# Patient Record
Sex: FEMALE | Race: WHITE | NOT HISPANIC OR LATINO | URBAN - METROPOLITAN AREA
[De-identification: names, ages, dates, MRNs, and addresses within clinical notes are randomized per-mention and may not be internally consistent; named-entity substitution may affect disease eponyms.]

---

## 2016-08-17 LAB
EXTERNAL ABO GROUPING: NORMAL
EXTERNAL ANTIBODY SCREEN: NEGATIVE
EXTERNAL HEPATITIS B SURFACE ANTIGEN: NEGATIVE
EXTERNAL RH FACTOR: POSITIVE
EXTERNAL RUBELLA QUALITATIVE: NORMAL
EXTERNAL SYPHILIS RPR SCREEN: NORMAL
HIV1 AB SPEC QL IA.RAPID: NEGATIVE

## 2016-11-09 LAB — EXTERNAL URINE DRUG SCREEN: NORMAL

## 2017-01-05 LAB — EXTERNAL GTT 1 HOUR: 108

## 2017-03-01 ENCOUNTER — LAB REQUISITION (OUTPATIENT)
Dept: LAB | Facility: HOSPITAL | Age: 32
End: 2017-03-01

## 2017-03-01 DIAGNOSIS — Z36.9 ENCOUNTER FOR ANTENATAL SCREENING OF MOTHER: ICD-10-CM

## 2017-03-01 LAB — EXTERNAL GROUP B STREP ANTIGEN: NEGATIVE

## 2017-03-01 PROCEDURE — 87081 CULTURE SCREEN ONLY: CPT | Performed by: OBSTETRICS & GYNECOLOGY

## 2017-03-04 LAB — BACTERIA SPEC AEROBE CULT: NORMAL

## 2017-03-23 ENCOUNTER — HOSPITAL ENCOUNTER (INPATIENT)
Dept: LABOR AND DELIVERY | Facility: HOSPITAL | Age: 32
LOS: 2 days | Discharge: HOME | End: 2017-03-25
Attending: OBSTETRICS & GYNECOLOGY | Admitting: OBSTETRICS & GYNECOLOGY

## 2017-03-23 ENCOUNTER — ANESTHESIA (OUTPATIENT)
Dept: LABOR AND DELIVERY | Facility: HOSPITAL | Age: 32
End: 2017-03-23

## 2017-03-23 ENCOUNTER — ANESTHESIA EVENT (OUTPATIENT)
Dept: LABOR AND DELIVERY | Facility: HOSPITAL | Age: 32
End: 2017-03-23

## 2017-03-23 PROBLEM — Z34.90 PREGNANT: Status: ACTIVE | Noted: 2017-03-23

## 2017-03-23 LAB
ABO GROUP BLD: NORMAL
BLD GP AB SCN SERPL QL: NEGATIVE
DEPRECATED RDW RBC AUTO: 52.3 FL (ref 37–54)
ERYTHROCYTE [DISTWIDTH] IN BLOOD BY AUTOMATED COUNT: 14.7 % (ref 11.3–14.5)
HCT VFR BLD AUTO: 33.7 % (ref 34.5–44)
HGB BLD-MCNC: 10.7 G/DL (ref 11.5–15.5)
MCH RBC QN AUTO: 30.7 PG (ref 27–31)
MCHC RBC AUTO-ENTMCNC: 31.8 G/DL (ref 32–36)
MCV RBC AUTO: 96.8 FL (ref 80–99)
PLATELET # BLD AUTO: 161 10*3/MM3 (ref 150–450)
PMV BLD AUTO: 10.8 FL (ref 6–12)
RBC # BLD AUTO: 3.48 10*6/MM3 (ref 3.89–5.14)
RH BLD: POSITIVE
WBC NRBC COR # BLD: 10.11 10*3/MM3 (ref 3.5–10.8)

## 2017-03-23 PROCEDURE — 86900 BLOOD TYPING SEROLOGIC ABO: CPT | Performed by: OBSTETRICS & GYNECOLOGY

## 2017-03-23 PROCEDURE — 59025 FETAL NON-STRESS TEST: CPT

## 2017-03-23 PROCEDURE — 25010000002 ONDANSETRON PER 1 MG: Performed by: OBSTETRICS & GYNECOLOGY

## 2017-03-23 PROCEDURE — 85027 COMPLETE CBC AUTOMATED: CPT | Performed by: OBSTETRICS & GYNECOLOGY

## 2017-03-23 PROCEDURE — 86850 RBC ANTIBODY SCREEN: CPT | Performed by: OBSTETRICS & GYNECOLOGY

## 2017-03-23 PROCEDURE — 10907ZC DRAINAGE OF AMNIOTIC FLUID, THERAPEUTIC FROM PRODUCTS OF CONCEPTION, VIA NATURAL OR ARTIFICIAL OPENING: ICD-10-PCS | Performed by: OBSTETRICS & GYNECOLOGY

## 2017-03-23 PROCEDURE — 25010000002 FENTANYL CITRATE (PF) 100 MCG/2ML SOLUTION: Performed by: NURSE ANESTHETIST, CERTIFIED REGISTERED

## 2017-03-23 PROCEDURE — C1755 CATHETER, INTRASPINAL: HCPCS

## 2017-03-23 PROCEDURE — 86901 BLOOD TYPING SEROLOGIC RH(D): CPT | Performed by: OBSTETRICS & GYNECOLOGY

## 2017-03-23 PROCEDURE — C1755 CATHETER, INTRASPINAL: HCPCS | Performed by: ANESTHESIOLOGY

## 2017-03-23 PROCEDURE — 25010000002 ROPIVACAINE PER 1 MG: Performed by: NURSE ANESTHETIST, CERTIFIED REGISTERED

## 2017-03-23 RX ORDER — PROMETHAZINE HYDROCHLORIDE 12.5 MG/1
12.5 TABLET ORAL EVERY 6 HOURS PRN
Status: DISCONTINUED | OUTPATIENT
Start: 2017-03-23 | End: 2017-03-23 | Stop reason: HOSPADM

## 2017-03-23 RX ORDER — OXYTOCIN/RINGER'S LACTATE 20/1000 ML
999 PLASTIC BAG, INJECTION (ML) INTRAVENOUS ONCE
Status: COMPLETED | OUTPATIENT
Start: 2017-03-23 | End: 2017-03-23

## 2017-03-23 RX ORDER — PROMETHAZINE HYDROCHLORIDE 12.5 MG/1
12.5 SUPPOSITORY RECTAL EVERY 6 HOURS PRN
Status: DISCONTINUED | OUTPATIENT
Start: 2017-03-23 | End: 2017-03-23 | Stop reason: HOSPADM

## 2017-03-23 RX ORDER — PROMETHAZINE HYDROCHLORIDE 25 MG/ML
12.5 INJECTION, SOLUTION INTRAMUSCULAR; INTRAVENOUS EVERY 6 HOURS PRN
Status: DISCONTINUED | OUTPATIENT
Start: 2017-03-23 | End: 2017-03-23

## 2017-03-23 RX ORDER — LIDOCAINE HYDROCHLORIDE 20 MG/ML
INJECTION, SOLUTION INFILTRATION; PERINEURAL AS NEEDED
Status: DISCONTINUED | OUTPATIENT
Start: 2017-03-23 | End: 2017-03-23 | Stop reason: SURG

## 2017-03-23 RX ORDER — IBUPROFEN 600 MG/1
600 TABLET ORAL EVERY 6 HOURS PRN
Status: DISCONTINUED | OUTPATIENT
Start: 2017-03-23 | End: 2017-03-25 | Stop reason: HOSPADM

## 2017-03-23 RX ORDER — CARBOPROST TROMETHAMINE 250 UG/ML
250 INJECTION, SOLUTION INTRAMUSCULAR AS NEEDED
Status: DISCONTINUED | OUTPATIENT
Start: 2017-03-23 | End: 2017-03-23 | Stop reason: HOSPADM

## 2017-03-23 RX ORDER — ONDANSETRON 2 MG/ML
4 INJECTION INTRAMUSCULAR; INTRAVENOUS ONCE AS NEEDED
Status: DISCONTINUED | OUTPATIENT
Start: 2017-03-23 | End: 2017-03-23

## 2017-03-23 RX ORDER — ACETAMINOPHEN 325 MG/1
650 TABLET ORAL EVERY 4 HOURS PRN
Status: DISCONTINUED | OUTPATIENT
Start: 2017-03-23 | End: 2017-03-23

## 2017-03-23 RX ORDER — PROMETHAZINE HYDROCHLORIDE 25 MG/1
25 TABLET ORAL EVERY 6 HOURS PRN
COMMUNITY
End: 2017-03-25 | Stop reason: HOSPADM

## 2017-03-23 RX ORDER — DIPHENHYDRAMINE HYDROCHLORIDE 50 MG/ML
12.5 INJECTION INTRAMUSCULAR; INTRAVENOUS EVERY 8 HOURS PRN
Status: DISCONTINUED | OUTPATIENT
Start: 2017-03-23 | End: 2017-03-23

## 2017-03-23 RX ORDER — METOCLOPRAMIDE HYDROCHLORIDE 5 MG/ML
10 INJECTION INTRAMUSCULAR; INTRAVENOUS ONCE AS NEEDED
Status: DISCONTINUED | OUTPATIENT
Start: 2017-03-23 | End: 2017-03-23

## 2017-03-23 RX ORDER — FAMOTIDINE 10 MG/ML
20 INJECTION, SOLUTION INTRAVENOUS ONCE AS NEEDED
Status: DISCONTINUED | OUTPATIENT
Start: 2017-03-23 | End: 2017-03-23

## 2017-03-23 RX ORDER — SODIUM CHLORIDE 0.9 % (FLUSH) 0.9 %
1-10 SYRINGE (ML) INJECTION AS NEEDED
Status: DISCONTINUED | OUTPATIENT
Start: 2017-03-23 | End: 2017-03-23

## 2017-03-23 RX ORDER — OXYTOCIN/RINGER'S LACTATE 30/500 ML
2-24 PLASTIC BAG, INJECTION (ML) INTRAVENOUS
Status: DISCONTINUED | OUTPATIENT
Start: 2017-03-23 | End: 2017-03-23

## 2017-03-23 RX ORDER — POLYETHYLENE GLYCOL 3350 17 G/17G
17 POWDER, FOR SOLUTION ORAL DAILY
Status: DISCONTINUED | OUTPATIENT
Start: 2017-03-23 | End: 2017-03-25 | Stop reason: HOSPADM

## 2017-03-23 RX ORDER — SODIUM CHLORIDE, SODIUM LACTATE, POTASSIUM CHLORIDE, CALCIUM CHLORIDE 600; 310; 30; 20 MG/100ML; MG/100ML; MG/100ML; MG/100ML
125 INJECTION, SOLUTION INTRAVENOUS CONTINUOUS
Status: DISCONTINUED | OUTPATIENT
Start: 2017-03-23 | End: 2017-03-23

## 2017-03-23 RX ORDER — PROMETHAZINE HYDROCHLORIDE 12.5 MG/1
12.5 SUPPOSITORY RECTAL EVERY 6 HOURS PRN
Status: DISCONTINUED | OUTPATIENT
Start: 2017-03-23 | End: 2017-03-23

## 2017-03-23 RX ORDER — BISACODYL 10 MG
10 SUPPOSITORY, RECTAL RECTAL DAILY PRN
Status: DISCONTINUED | OUTPATIENT
Start: 2017-03-24 | End: 2017-03-25 | Stop reason: HOSPADM

## 2017-03-23 RX ORDER — PROMETHAZINE HYDROCHLORIDE 25 MG/ML
12.5 INJECTION, SOLUTION INTRAMUSCULAR; INTRAVENOUS EVERY 6 HOURS PRN
Status: DISCONTINUED | OUTPATIENT
Start: 2017-03-23 | End: 2017-03-23 | Stop reason: HOSPADM

## 2017-03-23 RX ORDER — MORPHINE SULFATE 4 MG/ML
2 INJECTION, SOLUTION INTRAMUSCULAR; INTRAVENOUS
Status: DISCONTINUED | OUTPATIENT
Start: 2017-03-23 | End: 2017-03-23 | Stop reason: HOSPADM

## 2017-03-23 RX ORDER — PRENATAL WITH FERROUS FUM AND FOLIC ACID 3080; 920; 120; 400; 22; 1.84; 3; 20; 10; 1; 12; 200; 27; 25; 2 [IU]/1; [IU]/1; MG/1; [IU]/1; MG/1; MG/1; MG/1; MG/1; MG/1; MG/1; UG/1; MG/1; MG/1; MG/1; MG/1
TABLET ORAL DAILY
COMMUNITY

## 2017-03-23 RX ORDER — OXYTOCIN/RINGER'S LACTATE 20/1000 ML
125 PLASTIC BAG, INJECTION (ML) INTRAVENOUS AS NEEDED
Status: DISCONTINUED | OUTPATIENT
Start: 2017-03-23 | End: 2017-03-23 | Stop reason: HOSPADM

## 2017-03-23 RX ORDER — LIDOCAINE HYDROCHLORIDE AND EPINEPHRINE 20; 5 MG/ML; UG/ML
INJECTION, SOLUTION EPIDURAL; INFILTRATION; INTRACAUDAL; PERINEURAL AS NEEDED
Status: DISCONTINUED | OUTPATIENT
Start: 2017-03-23 | End: 2017-03-23 | Stop reason: SURG

## 2017-03-23 RX ORDER — IBUPROFEN 600 MG/1
600 TABLET ORAL EVERY 6 HOURS PRN
Status: DISCONTINUED | OUTPATIENT
Start: 2017-03-23 | End: 2017-03-23 | Stop reason: HOSPADM

## 2017-03-23 RX ORDER — ZOLPIDEM TARTRATE 5 MG/1
5 TABLET ORAL NIGHTLY PRN
Status: DISCONTINUED | OUTPATIENT
Start: 2017-03-23 | End: 2017-03-25 | Stop reason: HOSPADM

## 2017-03-23 RX ORDER — HYDROCODONE BITARTRATE AND ACETAMINOPHEN 5; 325 MG/1; MG/1
1 TABLET ORAL EVERY 4 HOURS PRN
Status: DISCONTINUED | OUTPATIENT
Start: 2017-03-23 | End: 2017-03-25 | Stop reason: HOSPADM

## 2017-03-23 RX ORDER — PROMETHAZINE HYDROCHLORIDE 12.5 MG/1
12.5 TABLET ORAL EVERY 6 HOURS PRN
Status: DISCONTINUED | OUTPATIENT
Start: 2017-03-23 | End: 2017-03-23

## 2017-03-23 RX ORDER — DOCUSATE SODIUM 100 MG/1
100 CAPSULE, LIQUID FILLED ORAL 2 TIMES DAILY
Status: DISCONTINUED | OUTPATIENT
Start: 2017-03-23 | End: 2017-03-25 | Stop reason: HOSPADM

## 2017-03-23 RX ORDER — TERBUTALINE SULFATE 1 MG/ML
0.25 INJECTION, SOLUTION SUBCUTANEOUS AS NEEDED
Status: DISCONTINUED | OUTPATIENT
Start: 2017-03-23 | End: 2017-03-23

## 2017-03-23 RX ORDER — PRENATAL WITH FERROUS FUM AND FOLIC ACID 3080; 920; 120; 400; 22; 1.84; 3; 20; 10; 1; 12; 200; 27; 25; 2 [IU]/1; [IU]/1; MG/1; [IU]/1; MG/1; MG/1; MG/1; MG/1; MG/1; MG/1; UG/1; MG/1; MG/1; MG/1; MG/1
1 TABLET ORAL DAILY
Status: DISCONTINUED | OUTPATIENT
Start: 2017-03-23 | End: 2017-03-25 | Stop reason: HOSPADM

## 2017-03-23 RX ORDER — ONDANSETRON 2 MG/ML
4 INJECTION INTRAMUSCULAR; INTRAVENOUS EVERY 6 HOURS PRN
Status: DISCONTINUED | OUTPATIENT
Start: 2017-03-23 | End: 2017-03-25 | Stop reason: HOSPADM

## 2017-03-23 RX ORDER — LANOLIN 100 %
OINTMENT (GRAM) TOPICAL
Status: DISCONTINUED | OUTPATIENT
Start: 2017-03-23 | End: 2017-03-25 | Stop reason: HOSPADM

## 2017-03-23 RX ORDER — LIDOCAINE HYDROCHLORIDE AND EPINEPHRINE 15; 5 MG/ML; UG/ML
INJECTION, SOLUTION EPIDURAL AS NEEDED
Status: DISCONTINUED | OUTPATIENT
Start: 2017-03-23 | End: 2017-03-23 | Stop reason: SURG

## 2017-03-23 RX ORDER — PROMETHAZINE HYDROCHLORIDE 25 MG/1
25 TABLET ORAL EVERY 6 HOURS PRN
Status: DISCONTINUED | OUTPATIENT
Start: 2017-03-23 | End: 2017-03-25 | Stop reason: HOSPADM

## 2017-03-23 RX ORDER — EPHEDRINE SULFATE/0.9% NACL/PF 50 MG/10ML
10 SYRINGE (ML) INTRAVENOUS
Status: DISCONTINUED | OUTPATIENT
Start: 2017-03-23 | End: 2017-03-23

## 2017-03-23 RX ORDER — METHYLERGONOVINE MALEATE 0.2 MG/ML
200 INJECTION INTRAVENOUS AS NEEDED
Status: DISCONTINUED | OUTPATIENT
Start: 2017-03-23 | End: 2017-03-23 | Stop reason: HOSPADM

## 2017-03-23 RX ORDER — FENTANYL CITRATE 50 UG/ML
INJECTION, SOLUTION INTRAMUSCULAR; INTRAVENOUS AS NEEDED
Status: DISCONTINUED | OUTPATIENT
Start: 2017-03-23 | End: 2017-03-23 | Stop reason: SURG

## 2017-03-23 RX ORDER — LIDOCAINE HYDROCHLORIDE 10 MG/ML
5 INJECTION, SOLUTION INFILTRATION; PERINEURAL AS NEEDED
Status: DISCONTINUED | OUTPATIENT
Start: 2017-03-23 | End: 2017-03-23

## 2017-03-23 RX ORDER — MISOPROSTOL 200 UG/1
800 TABLET ORAL AS NEEDED
Status: DISCONTINUED | OUTPATIENT
Start: 2017-03-23 | End: 2017-03-23 | Stop reason: HOSPADM

## 2017-03-23 RX ADMIN — SODIUM CHLORIDE, POTASSIUM CHLORIDE, SODIUM LACTATE AND CALCIUM CHLORIDE 125 ML/HR: 600; 310; 30; 20 INJECTION, SOLUTION INTRAVENOUS at 09:10

## 2017-03-23 RX ADMIN — HYDROCODONE BITARTRATE AND ACETAMINOPHEN 1 TABLET: 5; 325 TABLET ORAL at 19:53

## 2017-03-23 RX ADMIN — BENZOCAINE AND MENTHOL: 20; .5 SPRAY TOPICAL at 15:19

## 2017-03-23 RX ADMIN — LIDOCAINE HYDROCHLORIDE AND EPINEPHRINE 3 ML: 15; 5 INJECTION, SOLUTION EPIDURAL at 10:40

## 2017-03-23 RX ADMIN — SODIUM CHLORIDE, POTASSIUM CHLORIDE, SODIUM LACTATE AND CALCIUM CHLORIDE 1000 ML: 600; 310; 30; 20 INJECTION, SOLUTION INTRAVENOUS at 10:30

## 2017-03-23 RX ADMIN — ROPIVACAINE HYDROCHLORIDE 8 ML: 5 INJECTION, SOLUTION EPIDURAL; INFILTRATION; PERINEURAL at 10:46

## 2017-03-23 RX ADMIN — ONDANSETRON 4 MG: 2 INJECTION INTRAMUSCULAR; INTRAVENOUS at 09:15

## 2017-03-23 RX ADMIN — SODIUM CHLORIDE, POTASSIUM CHLORIDE, SODIUM LACTATE AND CALCIUM CHLORIDE 125 ML/HR: 600; 310; 30; 20 INJECTION, SOLUTION INTRAVENOUS at 08:15

## 2017-03-23 RX ADMIN — Medication 999 ML/HR: at 13:00

## 2017-03-23 RX ADMIN — ROPIVACAINE HYDROCHLORIDE 16 ML/HR: 5 INJECTION, SOLUTION EPIDURAL; INFILTRATION; PERINEURAL at 10:49

## 2017-03-23 RX ADMIN — LIDOCAINE HYDROCHLORIDE,EPINEPHRINE BITARTRATE 6 ML: 20; .005 INJECTION, SOLUTION EPIDURAL; INFILTRATION; INTRACAUDAL; PERINEURAL at 11:26

## 2017-03-23 RX ADMIN — Medication 125 ML/HR: at 14:31

## 2017-03-23 RX ADMIN — IBUPROFEN 600 MG: 600 TABLET ORAL at 16:08

## 2017-03-23 RX ADMIN — HYDROCORTISONE 2.5% 1 APPLICATION: 25 CREAM TOPICAL at 15:19

## 2017-03-23 RX ADMIN — FENTANYL CITRATE 100 MCG: 50 INJECTION, SOLUTION INTRAMUSCULAR; INTRAVENOUS at 10:43

## 2017-03-23 RX ADMIN — WITCH HAZEL 1 PAD: 500 SOLUTION RECTAL; TOPICAL at 15:19

## 2017-03-23 RX ADMIN — DOCUSATE SODIUM 100 MG: 100 CAPSULE, LIQUID FILLED ORAL at 16:09

## 2017-03-23 RX ADMIN — Medication 2 MILLI-UNITS/MIN: at 08:35

## 2017-03-23 RX ADMIN — LIDOCAINE HYDROCHLORIDE 2 ML: 20 INJECTION, SOLUTION INFILTRATION; PERINEURAL at 10:58

## 2017-03-23 NOTE — ANESTHESIA PREPROCEDURE EVALUATION
Anesthesia Evaluation     Patient summary reviewed and Nursing notes reviewed   no history of anesthetic complications:  NPO Status: > 4 hours   Airway   Mallampati: II  TM distance: <3 FB  Neck ROM: full  possible difficult intubation  Dental - normal exam     Pulmonary - negative pulmonary ROS   Cardiovascular         Neuro/Psych  (+) headaches (Migraines), psychiatric history Anxiety and Depression,    GI/Hepatic/Renal/Endo      Musculoskeletal (-) negative ROS    Abdominal    Substance History - negative use     OB/GYN    (+) Pregnant,         Other - negative ROS                                 Anesthesia Plan    ASA 2     epidural     Anesthetic plan and risks discussed with patient.

## 2017-03-23 NOTE — L&D DELIVERY NOTE
[facility]  Vaginal Delivery Note    Delivery     Delivery: Vaginal, Spontaneous Delivery     YOB: 2018    Time of Birth: 12:56 PM      Anesthesia: Epidural     Delivering clinician: Danni Reed    Forceps?   No   Vacuum? No    Shoulder dystocia present: No        Delivery narrative:  After vaginal preparation with betadine, Maddison Wiley delivered a viable male infant over intact perineum with epidural anesthesia on HD1. Delivery was uncomplicated. After delivery 30 seconds of delayed cord clamping was enforced prior to clamping and cutting of umbilical cord. Placenta was delivered spontaneously and patient was given 20 units of pitocin after delivery of placenta. Vagina, perineum and cervix was examined which revealed no lacerations.     Infant    Findings: male  infant     Infant observations: Weight: 7 lb 2.8 oz (3.255 kg)   Length: 20  in  Observations/Comments:         Apgars: 8   @ 1 minute /    9   @ 5 minutes   Infant Name: Rodney`     Placenta, Cord, and Fluid    Placenta delivered     at   3/23 12:59 PM     Cord:    present.   Nuchal Cord?  no   Cord blood obtained: Yes    Cord gases obtained:    Not indicated             Repair    Episiotomy: None   Lacerations: No   Estimated Blood Loss:  300 ml           Complications  none    Disposition  Mother to Mother Baby/Postpartum  in stable condition currently.  Baby to remains with mom  in stable condition currently.      Sukhdev Russell MD  03/23/17  1:09 PM

## 2017-03-23 NOTE — PLAN OF CARE
Problem: Labor (Cervical Ripen, Induct, Augment) (Adult,Obstetrics,Pediatric)  Goal: Signs and Symptoms of Listed Potential Problems Will be Absent or Manageable (Labor)  Outcome: Outcome(s) achieved Date Met:  03/23/17 03/23/17 1359   Labor (Cervical Ripen, Induct, Augment)   Problems Assessed (Labor) all   Problems Present (Labor) none

## 2017-03-23 NOTE — ANESTHESIA PROCEDURE NOTES
Labor Epidural    Patient location during procedure: OB  Performed By  Anesthesiologist: FABIANA ROGERS  CRNA: PERCY MCDOWELL  Preanesthetic Checklist  Completed: patient identified, surgical consent, pre-op evaluation, timeout performed, IV checked, risks and benefits discussed and monitors and equipment checked  Epidural Block Prep:  Pt Position:sitting  Sterile Tech:cap, gloves, mask and sterile barrier  Prep:DuraPrep  Monitoring:blood pressure monitoring  Epidural Block Procedure:  Approach:midline  Guidance:palpation technique  Location:L3-L4  Needle Type:Tuohy  Needle Gauge:17 G  Cath Depth at skin:12 cm  Paresthesia: right and transient  Aspiration:negative  Test Dose:negative  Post Assessment:  Dressing:occlusive dressing applied and secured with tape  Pt Tolerance:patient tolerated the procedure well with no apparent complications  Complications:no

## 2017-03-23 NOTE — NURSING NOTE
Pt up in [facility], resident md cher levin at bedside

## 2017-03-23 NOTE — PLAN OF CARE
Problem: Patient Care Overview (Adult)  Goal: Plan of Care Review  Outcome: Ongoing (interventions implemented as appropriate)    03/23/17 0930   Coping/Psychosocial Response Interventions   Plan Of Care Reviewed With patient;spouse   Patient Care Overview   Progress progress toward functional goals as expected       Goal: Adult Individualization and Mutuality  Outcome: Ongoing (interventions implemented as appropriate)    03/23/17 0930   Individualization   Patient Specific Preferences have a epidural, have a vaginal delivery        Goal: Discharge Needs Assessment  Outcome: Ongoing (interventions implemented as appropriate)    03/23/17 0930   Discharge Needs Assessment   Concerns To Be Addressed no discharge needs identified   Readmission Within The Last 30 Days no previous admission in last 30 days   Equipment Needed After Discharge none   Current Health   Anticipated Changes Related to Illness none   Living Environment   Transportation Available car         Problem: Labor (Cervical Ripen, Induct, Augment) (Adult,Obstetrics,Pediatric)  Intervention: Provide Emotional Support and Encouragement    03/23/17 0930   Coping Strategies   Trust Relationship/Rapport care explained;choices provided;emotional support provided;empathic listening provided;questions answered;questions encouraged;reassurance provided;thoughts/feelings acknowledged         Goal: Signs and Symptoms of Listed Potential Problems Will be Absent or Manageable (Labor)  Outcome: Ongoing (interventions implemented as appropriate)

## 2017-03-24 LAB
HCT VFR BLD AUTO: 32.7 % (ref 34.5–44)
HGB BLD-MCNC: 10.6 G/DL (ref 11.5–15.5)

## 2017-03-24 PROCEDURE — 85014 HEMATOCRIT: CPT | Performed by: OBSTETRICS & GYNECOLOGY

## 2017-03-24 PROCEDURE — 85018 HEMOGLOBIN: CPT | Performed by: OBSTETRICS & GYNECOLOGY

## 2017-03-24 RX ORDER — ONDANSETRON 4 MG/1
4 TABLET, FILM COATED ORAL EVERY 6 HOURS PRN
Status: DISCONTINUED | OUTPATIENT
Start: 2017-03-24 | End: 2017-03-25 | Stop reason: HOSPADM

## 2017-03-24 RX ADMIN — HYDROCODONE BITARTRATE AND ACETAMINOPHEN 1 TABLET: 5; 325 TABLET ORAL at 16:19

## 2017-03-24 RX ADMIN — PRENATAL WITH FERROUS FUM AND FOLIC ACID 1 TABLET: 3080; 920; 120; 400; 22; 1.84; 3; 20; 10; 1; 12; 200; 27; 25; 2 TABLET ORAL at 08:03

## 2017-03-24 RX ADMIN — DOCUSATE SODIUM 100 MG: 100 CAPSULE, LIQUID FILLED ORAL at 18:28

## 2017-03-24 RX ADMIN — IBUPROFEN 600 MG: 600 TABLET ORAL at 08:03

## 2017-03-24 RX ADMIN — IBUPROFEN 600 MG: 600 TABLET ORAL at 16:19

## 2017-03-24 RX ADMIN — DOCUSATE SODIUM 100 MG: 100 CAPSULE, LIQUID FILLED ORAL at 08:03

## 2017-03-24 RX ADMIN — HYDROCODONE BITARTRATE AND ACETAMINOPHEN 1 TABLET: 5; 325 TABLET ORAL at 22:53

## 2017-03-24 RX ADMIN — ONDANSETRON 4 MG: 4 TABLET, FILM COATED ORAL at 13:36

## 2017-03-24 RX ADMIN — IBUPROFEN 600 MG: 600 TABLET ORAL at 22:53

## 2017-03-24 RX ADMIN — HYDROCODONE BITARTRATE AND ACETAMINOPHEN 1 TABLET: 5; 325 TABLET ORAL at 03:03

## 2017-03-24 NOTE — PROGRESS NOTES
3/24/2017  PPD #1    Subjective   Maddison feels well.  Patient describes her lochia less than menses.  Pain is well controlled       Objective   Temp: Temp:  [98 °F (36.7 °C)-98.6 °F (37 °C)] 98 °F (36.7 °C) Temp src: Oral   BP: BP: ()/(54-97) 119/82        Pulse: Heart Rate:  [] 72  RR: Resp:  [16-18] 16    General:  No acute distress   Abdomen: Fundus firm and beneath umbilicus   Pelvis: deferred     Lab Results   Component Value Date    WBC 10.11 03/23/2017    HGB 10.6 (L) 03/24/2017    HCT 32.7 (L) 03/24/2017    MCV 96.8 03/23/2017     03/23/2017    HEPBSAG Negative 08/17/2016     Infant male, doing well. Desires circ.     Assessment  1. PPD# 1 after vaginal delivery    Plan  1. Routine postpartum care.      This note has been electronically signed.    Nena Morrison, APRN  March 24, 2017

## 2017-03-24 NOTE — ANESTHESIA POSTPROCEDURE EVALUATION
Patient: Maddison Wiley    Procedure Summary     Date Anesthesia Start Anesthesia Stop Room / Location    03/23/17 1030 0046        Procedure Diagnosis Scheduled Providers Provider    LABOR ANALGESIA No diagnosis on file.  Boubacar Parr MD          Anesthesia Type: epidural  Last vitals  BP      Temp      Pulse     Resp      SpO2        Post Anesthesia Care and Evaluation    Patient location during evaluation: bedside  Patient participation: complete - patient participated  Level of consciousness: awake and alert  Pain management: adequate  Airway patency: patent  Anesthetic complications: No anesthetic complications    Cardiovascular status: acceptable  Respiratory status: acceptable  Hydration status: acceptable  Post Neuraxial Block status: Motor and sensory function returned to baseline and No signs or symptoms of PDPH

## 2017-03-24 NOTE — PLAN OF CARE
Problem: Patient Care Overview (Adult)  Goal: Plan of Care Review  Outcome: Ongoing (interventions implemented as appropriate)    03/24/17 1605   Coping/Psychosocial Response Interventions   Plan Of Care Reviewed With patient   Patient Care Overview   Progress improving   Outcome Evaluation   Outcome Summary/Follow up Plan VSS; labs stable; Fundus firm; bleeding small; Pain controlled with Motrin; Pt. complained of nausea this afternoon but symptom relieved with meds; doing well       Goal: Adult Individualization and Mutuality  Outcome: Ongoing (interventions implemented as appropriate)  Goal: Discharge Needs Assessment  Outcome: Ongoing (interventions implemented as appropriate)    Problem: Postpartum, Vaginal Delivery (Adult)  Goal: Signs and Symptoms of Listed Potential Problems Will be Absent or Manageable (Postpartum, Vaginal Delivery)  Outcome: Ongoing (interventions implemented as appropriate)

## 2017-03-25 VITALS
SYSTOLIC BLOOD PRESSURE: 110 MMHG | HEIGHT: 64 IN | DIASTOLIC BLOOD PRESSURE: 70 MMHG | HEART RATE: 70 BPM | WEIGHT: 135 LBS | TEMPERATURE: 97.9 F | BODY MASS INDEX: 23.05 KG/M2 | RESPIRATION RATE: 16 BRPM

## 2017-03-25 PROBLEM — Z34.90 PREGNANT: Status: RESOLVED | Noted: 2017-03-23 | Resolved: 2017-03-25

## 2017-03-25 RX ORDER — IBUPROFEN 600 MG/1
600 TABLET ORAL EVERY 6 HOURS PRN
Qty: 30 TABLET | Refills: 0 | Status: SHIPPED | OUTPATIENT
Start: 2017-03-25 | End: 2022-05-05

## 2017-03-25 RX ADMIN — PRENATAL WITH FERROUS FUM AND FOLIC ACID 1 TABLET: 3080; 920; 120; 400; 22; 1.84; 3; 20; 10; 1; 12; 200; 27; 25; 2 TABLET ORAL at 09:05

## 2017-03-25 RX ADMIN — DOCUSATE SODIUM 100 MG: 100 CAPSULE, LIQUID FILLED ORAL at 09:05

## 2017-03-25 RX ADMIN — ONDANSETRON 4 MG: 4 TABLET, FILM COATED ORAL at 06:07

## 2017-03-25 RX ADMIN — IBUPROFEN 600 MG: 600 TABLET ORAL at 06:07

## 2017-03-25 NOTE — PLAN OF CARE
Problem: Patient Care Overview (Adult)  Goal: Plan of Care Review  Outcome: Outcome(s) achieved Date Met:  03/25/17 03/25/17 1128   Coping/Psychosocial Response Interventions   Plan Of Care Reviewed With patient   Patient Care Overview   Progress improving   Outcome Evaluation   Outcome Summary/Follow up Plan VSS pain well controlled with motrin ready for discharge       Goal: Adult Individualization and Mutuality  Outcome: Outcome(s) achieved Date Met:  03/25/17  Goal: Discharge Needs Assessment  Outcome: Outcome(s) achieved Date Met:  03/25/17    Problem: Postpartum, Vaginal Delivery (Adult)  Goal: Signs and Symptoms of Listed Potential Problems Will be Absent or Manageable (Postpartum, Vaginal Delivery)  Outcome: Outcome(s) achieved Date Met:  03/25/17

## 2017-03-25 NOTE — DISCHARGE SUMMARY
Discharge Summary    Date of Admission: 3/23/2017  Date of Discharge:  3/25/2017      Patient: Maddison Wiley      MR#:[id number]    Delivery Provider: Danni Reed         Presenting Problem/History of Present Illness  Pregnant [Z33.1]     Patient Active Problem List   Diagnosis   (none) - all problems resolved or deleted         Discharge Diagnosis: Vaginal delivery at 39w3d    Procedures:  Vaginal, Spontaneous Delivery     3/23/2017    12:56 PM        Discharge Date: 3/25/2017;     Hospital Course  Patient is a 32 y.o. female  at 39w3d status post vaginal delivery without complication. Postpartum the patient did well. She remained afebrile, with vital signs stable. She was ready for discharge on postpartum day 2.     Infant:   male  fetus 7 lb 2.8 oz (3.255 kg)  with Apgar scores of 8  , 9   at five minutes.    Condition on Discharge:  Stable    Vital Signs  Temp:  [98.1 °F (36.7 °C)-98.5 °F (36.9 °C)] 98.5 °F (36.9 °C)  Heart Rate:  [62-69] 62  Resp:  [14-16] 16  BP: (104-109)/(69-76) 109/76    Lab Results   Component Value Date    WBC 10.11 2017    HGB 10.6 (L) 2017    HCT 32.7 (L) 2017    MCV 96.8 2017     2017       Discharge Disposition  Home or Self Care    Discharge Medications   Maddison Wiley   Home Medication Instructions SOREN:[id number]    Printed on:17 0939   Medication Information                      ibuprofen (ADVIL,MOTRIN) 600 MG tablet  Take 1 tablet by mouth Every 6 (Six) Hours As Needed for Mild Pain (1-3).             Prenatal Vit-Fe Fumarate-FA (PRENATAL 27-1) 27-1 MG tablet tablet  Take  by mouth Daily.                 Discharge Diet:     Activity at Discharge:   Activity Instructions     Pelvic Rest                     Follow-up Appointments  No future appointments.  Additional Instructions for the Follow-ups that You Need to Schedule     Call MD for problems / concerns.    As directed        Discharge Follow-up with Specified Provider     As directed    To:  dr arcos   Follow Up:  6 Weeks   Follow Up Details:  pt must be seen by 6 wks pp       Follow-Up    As directed    Follow Up Details:  6 weeks with VAZQUEZ Calhoun  03/25/17  9:13 AM  Csd

## 2017-03-25 NOTE — PLAN OF CARE
Problem: Postpartum, Vaginal Delivery (Adult)  Goal: Signs and Symptoms of Listed Potential Problems Will be Absent or Manageable (Postpartum, Vaginal Delivery)  Outcome: Ongoing (interventions implemented as appropriate)    03/25/17 0619   Postpartum, Vaginal Delivery   Problems Assessed (Postpartum Vaginal Delivery) all   Problems Present (Postpartum Vaginal Delivery) none

## 2019-08-12 ENCOUNTER — HOSPITAL ENCOUNTER (OUTPATIENT)
Age: 34
End: 2019-08-12
Payer: COMMERCIAL

## 2019-08-12 DIAGNOSIS — K82.8: Primary | ICD-10-CM

## 2019-08-12 PROCEDURE — 78227 HEPATOBIL SYST IMAGE W/DRUG: CPT

## 2019-08-12 PROCEDURE — A9537 TC99M MEBROFENIN: HCPCS

## 2019-12-09 ENCOUNTER — HOSPITAL ENCOUNTER (OUTPATIENT)
Dept: HOSPITAL 22 - PT | Age: 34
Discharge: HOME | End: 2019-12-09
Payer: COMMERCIAL

## 2019-12-09 DIAGNOSIS — M53.82: ICD-10-CM

## 2019-12-09 DIAGNOSIS — M54.2: ICD-10-CM

## 2019-12-09 DIAGNOSIS — R51: Primary | ICD-10-CM

## 2019-12-09 DIAGNOSIS — M54.9: ICD-10-CM

## 2019-12-09 PROCEDURE — 97163 PT EVAL HIGH COMPLEX 45 MIN: CPT

## 2019-12-09 PROCEDURE — 97110 THERAPEUTIC EXERCISES: CPT

## 2019-12-09 PROCEDURE — 97140 MANUAL THERAPY 1/> REGIONS: CPT

## 2019-12-09 PROCEDURE — 97014 ELECTRIC STIMULATION THERAPY: CPT

## 2019-12-09 PROCEDURE — 97010 HOT OR COLD PACKS THERAPY: CPT

## 2019-12-09 PROCEDURE — 97012 MECHANICAL TRACTION THERAPY: CPT

## 2019-12-09 PROCEDURE — G0283 ELEC STIM OTHER THAN WOUND: HCPCS

## 2019-12-11 ENCOUNTER — HOSPITAL ENCOUNTER (OUTPATIENT)
Dept: HOSPITAL 22 - INF | Age: 34
Discharge: HOME | End: 2019-12-11
Payer: COMMERCIAL

## 2019-12-11 VITALS
HEART RATE: 84 BPM | SYSTOLIC BLOOD PRESSURE: 101 MMHG | DIASTOLIC BLOOD PRESSURE: 79 MMHG | OXYGEN SATURATION: 100 % | RESPIRATION RATE: 18 BRPM

## 2019-12-11 VITALS — RESPIRATION RATE: 16 BRPM | HEART RATE: 88 BPM | DIASTOLIC BLOOD PRESSURE: 71 MMHG | SYSTOLIC BLOOD PRESSURE: 108 MMHG

## 2019-12-11 VITALS — HEART RATE: 69 BPM | DIASTOLIC BLOOD PRESSURE: 75 MMHG | RESPIRATION RATE: 18 BRPM | SYSTOLIC BLOOD PRESSURE: 109 MMHG

## 2019-12-11 VITALS — DIASTOLIC BLOOD PRESSURE: 76 MMHG | SYSTOLIC BLOOD PRESSURE: 113 MMHG | HEART RATE: 71 BPM | RESPIRATION RATE: 16 BRPM

## 2019-12-11 VITALS — DIASTOLIC BLOOD PRESSURE: 77 MMHG | HEART RATE: 78 BPM | RESPIRATION RATE: 16 BRPM | SYSTOLIC BLOOD PRESSURE: 114 MMHG

## 2019-12-11 DIAGNOSIS — R51: Primary | ICD-10-CM

## 2019-12-11 DIAGNOSIS — R11.2: ICD-10-CM

## 2019-12-11 PROCEDURE — 96360 HYDRATION IV INFUSION INIT: CPT

## 2019-12-11 PROCEDURE — 96375 TX/PRO/DX INJ NEW DRUG ADDON: CPT

## 2019-12-11 PROCEDURE — 96361 HYDRATE IV INFUSION ADD-ON: CPT

## 2020-10-14 ENCOUNTER — HOSPITAL ENCOUNTER (OUTPATIENT)
Age: 35
End: 2020-10-14
Payer: COMMERCIAL

## 2020-10-14 DIAGNOSIS — Z03.818: Primary | ICD-10-CM

## 2020-10-14 LAB
HCT VFR BLD CALC: 38.8 % (ref 37–47)
HGB BLD-MCNC: 12.3 G/DL (ref 12.2–16.2)
MCHC RBC-ENTMCNC: 31.8 G/DL (ref 31.8–35.4)
MCV RBC: 98 FL (ref 81–99)
MEAN CORPUSCULAR HEMOGLOBIN: 31.2 PG (ref 27–31.2)
PLATELET # BLD: 318 K/MM3 (ref 142–424)
RBC # BLD AUTO: 3.96 M/MM3 (ref 4.2–5.4)
WBC # BLD AUTO: 6.5 K/MM3 (ref 4.8–10.8)

## 2020-10-14 PROCEDURE — 87430 STREP A AG IA: CPT

## 2020-10-14 PROCEDURE — 87798 DETECT AGENT NOS DNA AMP: CPT

## 2020-10-14 PROCEDURE — 85025 COMPLETE CBC W/AUTO DIFF WBC: CPT

## 2020-10-14 PROCEDURE — 87633 RESP VIRUS 12-25 TARGETS: CPT

## 2020-10-14 PROCEDURE — U0003 INFECTIOUS AGENT DETECTION BY NUCLEIC ACID (DNA OR RNA); SEVERE ACUTE RESPIRATORY SYNDROME CORONAVIRUS 2 (SARS-COV-2) (CORONAVIRUS DISEASE [COVID-19]), AMPLIFIED PROBE TECHNIQUE, MAKING USE OF HIGH THROUGHPUT TECHNOLOGIES AS DESCRIBED BY CMS-2020-01-R: HCPCS

## 2020-10-14 PROCEDURE — 87581 M.PNEUMON DNA AMP PROBE: CPT

## 2020-11-04 ENCOUNTER — OFFICE VISIT (OUTPATIENT)
Dept: OBSTETRICS AND GYNECOLOGY | Facility: CLINIC | Age: 35
End: 2020-11-04

## 2020-11-04 VITALS
SYSTOLIC BLOOD PRESSURE: 120 MMHG | DIASTOLIC BLOOD PRESSURE: 72 MMHG | WEIGHT: 134 LBS | HEIGHT: 64 IN | BODY MASS INDEX: 22.88 KG/M2 | TEMPERATURE: 97.8 F

## 2020-11-04 DIAGNOSIS — N93.9 ABNORMAL UTERINE BLEEDING (AUB): Primary | ICD-10-CM

## 2020-11-04 DIAGNOSIS — Z00.00 ANNUAL PHYSICAL EXAM: ICD-10-CM

## 2020-11-04 PROCEDURE — 99395 PREV VISIT EST AGE 18-39: CPT | Performed by: NURSE PRACTITIONER

## 2020-11-04 PROCEDURE — 36415 COLL VENOUS BLD VENIPUNCTURE: CPT | Performed by: NURSE PRACTITIONER

## 2020-11-04 NOTE — PROGRESS NOTES
GYN Annual Exam     CC - Here for annual exam.        AMANDO  Maddison Wiley is a 35 y.o. female, , who presents for annual well woman exam. Patient's last menstrual period was 10/29/2020..  Periods are irregular occurring every 2wks weeks, lasting 5 days.  x 6-7 months. Dysmenorrhea:moderate, occurring first 1-2 days of flow.   Partner Status: .  New Partners since last visit: no.  Desires STD Screening: no.    Additional OB/GYN History   Current contraception: contraceptive methods: None  Desires to: do not start contraception  Last Pap :     History of abnormal Pap smear: no  Family history of uterine, colon, breast, or ovarian cancer: yes - yes  Performs monthly Self-Breast Exam: yes  Exercises Regularly:no  Feelings of Anxiety or Depression: no  Tobacco Usage?: No   OB History        2    Para   2    Term   2            AB        Living   2       SAB        TAB        Ectopic        Molar        Multiple   0    Live Births   2                Health Maintenance   Topic Date Due   • Annual Gynecologic Pelvic and Breast Exam  1984   • ANNUAL PHYSICAL  1987   • TDAP/TD VACCINES (1 - Tdap) 2003   • INFLUENZA VACCINE  2020   • HEPATITIS C SCREENING  2020   • PAP SMEAR  2020   • Pneumococcal Vaccine 0-64  Aged Out       The additional following portions of the patient's history were reviewed and updated as appropriate: allergies, current medications, past family history, past medical history, past social history, past surgical history and problem list.    Review of Systems   Constitutional: Negative.    HENT: Negative.    Eyes: Negative.    Respiratory: Negative.    Cardiovascular: Negative.    Gastrointestinal: Positive for constipation.   Endocrine: Negative.    Genitourinary: Positive for menstrual problem and pelvic pain.   Musculoskeletal: Negative.    Allergic/Immunologic: Negative.    Neurological: Positive for headache.   Hematological: Negative.   "  Psychiatric/Behavioral: Negative.      All other systems reviewed and are negative.     I have reviewed and agree with the HPI, ROS, and historical information as entered above. Sylwia Gonzalez, APRN    Objective   /72   Temp 97.8 °F (36.6 °C)   Ht 162.6 cm (64\")   Wt 60.8 kg (134 lb)   LMP 10/29/2020   BMI 23.00 kg/m²     Physical Exam  Vitals signs and nursing note reviewed. Exam conducted with a chaperone present.   Constitutional:       Appearance: She is well-developed.   HENT:      Head: Normocephalic and atraumatic.   Neck:      Musculoskeletal: Normal range of motion. No muscular tenderness.      Thyroid: No thyroid mass or thyromegaly.   Pulmonary:      Effort: Pulmonary effort is normal. No retractions.   Chest:      Chest wall: No mass.      Breasts:         Right: Normal. No mass, nipple discharge, skin change or tenderness.         Left: Normal. No mass, nipple discharge, skin change or tenderness.   Abdominal:      General: Bowel sounds are normal.      Palpations: Abdomen is soft. Abdomen is not rigid. There is no mass.      Tenderness: There is no abdominal tenderness. There is no guarding.      Hernia: No hernia is present. There is no hernia in the left inguinal area.   Genitourinary:     Labia:         Right: No rash, tenderness or lesion.         Left: No rash, tenderness or lesion.       Vagina: Normal. No vaginal discharge or lesions.      Cervix: Normal.      Uterus: Normal. Not enlarged, not fixed and not tender.       Adnexa: Right adnexa normal and left adnexa normal.        Right: No mass or tenderness.          Left: No mass or tenderness.        Rectum: No external hemorrhoid.   Neurological:      Mental Status: She is alert and oriented to person, place, and time.   Psychiatric:         Behavior: Behavior normal.            Assessment and Plan    Problem List Items Addressed This Visit     None      Visit Diagnoses     Annual physical exam    -  Primary    Relevant Orders    " Pap IG, Rfx HPV ASCU    Abnormal uterine bleeding (AUB)              1. Enc her to start a daily PNV.    2. Preconceptual Counseling.  Discussed timed intercourse, regular cycles, prenatal vitamins, and when to call.  3. Reviewed monthly self breast exams.  Instructed to call with lumps, pain, or breast discharge.    4. Reviewed exercise as a preventative health measures.   5. Recommended use of Vitamin D replacement and getting adequate calcium in her diet. (1500mg)  6. Reccommended Flu Vaccine in Fall of each year.  7. RTC in 1 year or PRN with problems   8. Will notify of lab results.  RTO for US.      Sylwia Gonzalez, APRN  11/04/2020

## 2020-11-06 LAB
ERYTHROCYTE [DISTWIDTH] IN BLOOD BY AUTOMATED COUNT: 11.4 % (ref 12.3–15.4)
HCT VFR BLD AUTO: 35.7 % (ref 34–46.6)
HGB BLD-MCNC: 11.7 G/DL (ref 12–15.9)
MCH RBC QN AUTO: 31.3 PG (ref 26.6–33)
MCHC RBC AUTO-ENTMCNC: 32.8 G/DL (ref 31.5–35.7)
MCV RBC AUTO: 95.5 FL (ref 79–97)
PLATELET # BLD AUTO: 316 10*3/MM3 (ref 140–450)
PROLACTIN SERPL-MCNC: 15 NG/ML (ref 4.8–23.3)
RBC # BLD AUTO: 3.74 10*6/MM3 (ref 3.77–5.28)
TSH SERPL DL<=0.005 MIU/L-ACNC: 1.03 UIU/ML (ref 0.27–4.2)
WBC # BLD AUTO: 6.42 10*3/MM3 (ref 3.4–10.8)

## 2020-11-12 DIAGNOSIS — Z00.00 ANNUAL PHYSICAL EXAM: ICD-10-CM

## 2020-11-18 ENCOUNTER — OFFICE VISIT (OUTPATIENT)
Dept: OBSTETRICS AND GYNECOLOGY | Facility: CLINIC | Age: 35
End: 2020-11-18

## 2020-11-18 VITALS
SYSTOLIC BLOOD PRESSURE: 120 MMHG | DIASTOLIC BLOOD PRESSURE: 86 MMHG | BODY MASS INDEX: 22.71 KG/M2 | WEIGHT: 133 LBS | TEMPERATURE: 97.7 F | HEIGHT: 64 IN

## 2020-11-18 DIAGNOSIS — N93.9 ABNORMAL UTERINE BLEEDING (AUB): Primary | ICD-10-CM

## 2020-11-18 PROCEDURE — 58100 BIOPSY OF UTERUS LINING: CPT | Performed by: NURSE PRACTITIONER

## 2020-11-18 PROCEDURE — 99213 OFFICE O/P EST LOW 20 MIN: CPT | Performed by: NURSE PRACTITIONER

## 2020-11-18 NOTE — PROGRESS NOTES
Chief Complaint   Patient presents with   • Follow-up       Subjective   HPI  Maddison Wiley is a 35 y.o. female, , who presents for follow up pelvic pain and AUB.     Patient's last menstrual period was 10/29/2020..  Periods are every 2 wks lasting 7 days.  Dysmenorrhea: mild, occurring first 1-2 days of flow.    Partner Status: Marital Status: .  New Partners since last visit: no.  Desires STD Screening: no.    Pap and labs wnl last visit.    She has not been using birth control and would be okay if she conceived.  She is taking daily PNV.  She denies UPI x 2 wks and denies possible pregnancy.    Additional OB/GYN History   Current contraception: contraceptive methods: None  Desires to: do not start contraception  Last Pap : 2020 today   Last Completed Pap Smear       Status Date      PAP SMEAR Done 11/10/2020 PAP IG, RFX HPV ASCU        History of abnormal Pap smear: no  Last mammogram: none    Tobacco Usage?: no  OB History        2    Para   2    Term   2            AB        Living   2       SAB        TAB        Ectopic        Molar        Multiple   0    Live Births   2                Health Maintenance   Topic Date Due   • Annual Gynecologic Pelvic and Breast Exam  1984   • TDAP/TD VACCINES (1 - Tdap) 2003   • INFLUENZA VACCINE  2020   • HEPATITIS C SCREENING  2020   • ANNUAL PHYSICAL  2021   • PAP SMEAR  11/10/2023   • Pneumococcal Vaccine 0-64  Aged Out       The additional following portions of the patient's history were reviewed and updated as appropriate: problem list.    Review of Systems   Constitutional: Negative for fatigue and fever.   Respiratory: Negative for cough and shortness of breath.    Cardiovascular: Negative for chest pain and leg swelling.   Gastrointestinal: Negative.    Genitourinary: Positive for menstrual problem. Negative for decreased urine volume, difficulty urinating, dyspareunia, dysuria, flank pain, genital sores,  "hematuria, pelvic pain, pelvic pressure, urgency, urinary incontinence, vaginal bleeding, vaginal discharge and vaginal pain.   Musculoskeletal: Negative for back pain and gait problem.   Neurological: Negative for dizziness and headache.   Psychiatric/Behavioral: Negative for depressed mood. The patient is not nervous/anxious.        I have reviewed and agree with the HPI, ROS, and historical information as entered above. VAZQUEZ Winn    Objective   /86   Temp 97.7 °F (36.5 °C)   Ht 162.6 cm (64\")   Wt 60.3 kg (133 lb)   LMP 10/29/2020   BMI 22.83 kg/m²     Physical Exam  Vitals signs and nursing note reviewed. Exam conducted with a chaperone present.   Constitutional:       Appearance: Normal appearance.   Pulmonary:      Effort: Pulmonary effort is normal.   Abdominal:      General: There is no distension.      Palpations: Abdomen is soft. There is no mass.      Tenderness: There is no abdominal tenderness. There is no guarding or rebound.      Hernia: No hernia is present.   Genitourinary:     General: Normal vulva.      Vagina: Normal.      Cervix: Normal.      Uterus: Normal.       Adnexa: Right adnexa normal and left adnexa normal.   Neurological:      Mental Status: She is alert.         Assessment/Plan     Assessment     Problem List Items Addressed This Visit     None      Visit Diagnoses     Abnormal uterine bleeding (AUB)    -  Primary    Relevant Orders    Endometrial Biopsy    Endometrial Biopsy (Completed)        Endometrial Biopsy    Date/Time: 11/18/2020 1:13 PM  Performed by: Sylwia Gonzalez APRN  Authorized by: Sylwia Gonzalez APRN   Local anesthesia used: no    Anesthesia:  Local anesthesia used: no    Sedation:  Patient sedated: no    Patient tolerance: patient tolerated the procedure well with no immediate complications      Cervix was cleansed with Betadine and tenaculum applied at 12 oclock,  Pipelle was inserted through cervix up to fundus.  A small sample of endometrium " was obtained.  Tenaculum and speculum were removed.  Pt tolerated well with complaint of only mild crampiing.      Plan     D/w pt US today wnl with thickened EMS.  She denies possible pregnancy.  Will notify of results.  Take 3 packs of LoLoestrin.  Then stop and hopefully cycles will be better.  Call prn continued problems.      Sylwia Gonzalez, APRN  11/18/2020

## 2021-10-29 ENCOUNTER — HOSPITAL ENCOUNTER (OUTPATIENT)
Dept: HOSPITAL 22 - LAB.DROPOF | Age: 36
End: 2021-10-29
Payer: COMMERCIAL

## 2021-10-29 DIAGNOSIS — D64.9: Primary | ICD-10-CM

## 2021-10-29 DIAGNOSIS — N92.0: ICD-10-CM

## 2021-10-29 LAB
FOLATE: 8.32 NG/ML
IRON SERPL QL: 69 UG/DL (ref 37–170)
TSH SERPL-ACNC: 1.04 UIU/ML (ref 0.47–4.68)
VITAMIN B12: 638 PG/ML (ref 239–931)

## 2021-10-29 PROCEDURE — 83540 ASSAY OF IRON: CPT

## 2021-10-29 PROCEDURE — 84443 ASSAY THYROID STIM HORMONE: CPT

## 2021-10-29 PROCEDURE — 82607 VITAMIN B-12: CPT

## 2021-10-29 PROCEDURE — 82746 ASSAY OF FOLIC ACID SERUM: CPT

## 2021-11-08 ENCOUNTER — HOSPITAL ENCOUNTER (OUTPATIENT)
Age: 36
End: 2021-11-08
Payer: COMMERCIAL

## 2021-11-08 DIAGNOSIS — U07.1: ICD-10-CM

## 2021-11-08 DIAGNOSIS — Z20.822: Primary | ICD-10-CM

## 2021-11-08 LAB
HCT VFR BLD CALC: 35.7 % (ref 37–47)
HGB BLD-MCNC: 11.3 G/DL (ref 12.2–16.2)
MCHC RBC-ENTMCNC: 31.6 G/DL (ref 31.8–35.4)
MCV RBC: 97.7 FL (ref 81–99)
MEAN CORPUSCULAR HEMOGLOBIN: 30.9 PG (ref 27–31.2)
PLATELET # BLD: 246 K/MM3 (ref 142–424)
RBC # BLD AUTO: 3.65 M/MM3 (ref 4.2–5.4)
WBC # BLD AUTO: 4.5 K/MM3 (ref 4.8–10.8)

## 2021-11-08 PROCEDURE — 36415 COLL VENOUS BLD VENIPUNCTURE: CPT

## 2021-11-08 PROCEDURE — C9803 HOPD COVID-19 SPEC COLLECT: HCPCS

## 2021-11-08 PROCEDURE — 85025 COMPLETE CBC W/AUTO DIFF WBC: CPT

## 2021-11-08 PROCEDURE — U0005 INFEC AGEN DETEC AMPLI PROBE: HCPCS

## 2021-11-08 PROCEDURE — U0003 INFECTIOUS AGENT DETECTION BY NUCLEIC ACID (DNA OR RNA); SEVERE ACUTE RESPIRATORY SYNDROME CORONAVIRUS 2 (SARS-COV-2) (CORONAVIRUS DISEASE [COVID-19]), AMPLIFIED PROBE TECHNIQUE, MAKING USE OF HIGH THROUGHPUT TECHNOLOGIES AS DESCRIBED BY CMS-2020-01-R: HCPCS

## 2021-11-08 PROCEDURE — 87430 STREP A AG IA: CPT

## 2021-11-10 ENCOUNTER — HOSPITAL ENCOUNTER (OUTPATIENT)
Dept: HOSPITAL 22 - COVID.OUT | Age: 36
End: 2021-11-10
Payer: COMMERCIAL

## 2021-11-10 VITALS
OXYGEN SATURATION: 100 % | DIASTOLIC BLOOD PRESSURE: 74 MMHG | TEMPERATURE: 98 F | RESPIRATION RATE: 18 BRPM | HEART RATE: 84 BPM | SYSTOLIC BLOOD PRESSURE: 112 MMHG

## 2021-11-10 VITALS
SYSTOLIC BLOOD PRESSURE: 101 MMHG | DIASTOLIC BLOOD PRESSURE: 73 MMHG | OXYGEN SATURATION: 100 % | RESPIRATION RATE: 18 BRPM | HEART RATE: 78 BPM

## 2021-11-10 VITALS
HEART RATE: 83 BPM | OXYGEN SATURATION: 100 % | SYSTOLIC BLOOD PRESSURE: 109 MMHG | DIASTOLIC BLOOD PRESSURE: 76 MMHG | RESPIRATION RATE: 18 BRPM

## 2021-11-10 VITALS
DIASTOLIC BLOOD PRESSURE: 72 MMHG | HEART RATE: 74 BPM | SYSTOLIC BLOOD PRESSURE: 110 MMHG | OXYGEN SATURATION: 100 % | RESPIRATION RATE: 18 BRPM

## 2021-11-10 VITALS
SYSTOLIC BLOOD PRESSURE: 103 MMHG | DIASTOLIC BLOOD PRESSURE: 77 MMHG | HEART RATE: 89 BPM | OXYGEN SATURATION: 100 % | RESPIRATION RATE: 18 BRPM

## 2021-11-10 VITALS
HEART RATE: 71 BPM | SYSTOLIC BLOOD PRESSURE: 111 MMHG | OXYGEN SATURATION: 100 % | DIASTOLIC BLOOD PRESSURE: 81 MMHG | RESPIRATION RATE: 18 BRPM

## 2021-11-10 VITALS
DIASTOLIC BLOOD PRESSURE: 80 MMHG | HEART RATE: 74 BPM | SYSTOLIC BLOOD PRESSURE: 104 MMHG | OXYGEN SATURATION: 100 % | RESPIRATION RATE: 18 BRPM

## 2021-11-10 DIAGNOSIS — U07.1: Primary | ICD-10-CM

## 2021-11-10 DIAGNOSIS — Z23: ICD-10-CM

## 2021-11-10 PROCEDURE — 96365 THER/PROPH/DIAG IV INF INIT: CPT

## 2021-11-17 ENCOUNTER — HOSPITAL ENCOUNTER (OUTPATIENT)
Age: 36
End: 2021-11-17
Payer: COMMERCIAL

## 2021-11-17 DIAGNOSIS — Z20.822: Primary | ICD-10-CM

## 2021-11-17 LAB
ALBUMIN LEVEL: 4.4 G/DL (ref 3.5–5)
ALBUMIN/GLOB SERPL: 1.8 {RATIO} (ref 1.1–1.8)
ALP ISO SERPL-ACNC: 88 U/L (ref 38–126)
ALT SERPLBLD-CCNC: 16 U/L (ref 12–78)
ANION GAP SERPL CALC-SCNC: 12.2 MEQ/L (ref 5–15)
AST SERPL QL: 34 U/L (ref 14–36)
BILIRUBIN,TOTAL: 0.4 MG/DL (ref 0.2–1.3)
BUN SERPL-MCNC: 13 MG/DL (ref 7–17)
CALCIUM SPEC-MCNC: 9.1 MG/DL (ref 8.4–10.2)
CHLORIDE SPEC-SCNC: 103 MMOL/L (ref 98–107)
CO2 SERPL-SCNC: 31 MMOL/L (ref 22–30)
CREAT BLD-SCNC: 0.7 MG/DL (ref 0.52–1.04)
ESTIMATED GLOMERULAR FILT RATE: 94 ML/MIN (ref 60–?)
GFR (AFRICAN AMERICAN): 114 ML/MIN (ref 60–?)
GLOBULIN SER CALC-MCNC: 2.5 G/DL (ref 1.3–3.2)
GLUCOSE: 102 MG/DL (ref 74–100)
HCT VFR BLD CALC: 34.8 % (ref 37–47)
HGB BLD-MCNC: 11.7 G/DL (ref 12.2–16.2)
MCHC RBC-ENTMCNC: 33.6 G/DL (ref 31.8–35.4)
MCV RBC: 93.3 FL (ref 81–99)
MEAN CORPUSCULAR HEMOGLOBIN: 31.4 PG (ref 27–31.2)
PLATELET # BLD: 353 K/MM3 (ref 142–424)
POTASSIUM: 4.2 MMOL/L (ref 3.5–5.1)
PROT SERPL-MCNC: 6.9 G/DL (ref 6.3–8.2)
RBC # BLD AUTO: 3.73 M/MM3 (ref 4.2–5.4)
SODIUM SPEC-SCNC: 142 MMOL/L (ref 136–145)
WBC # BLD AUTO: 4.7 K/MM3 (ref 4.8–10.8)

## 2021-11-17 PROCEDURE — 80053 COMPREHEN METABOLIC PANEL: CPT

## 2021-11-17 PROCEDURE — 85025 COMPLETE CBC W/AUTO DIFF WBC: CPT

## 2021-11-17 PROCEDURE — 36415 COLL VENOUS BLD VENIPUNCTURE: CPT

## 2021-11-17 PROCEDURE — 71045 X-RAY EXAM CHEST 1 VIEW: CPT

## 2022-01-17 ENCOUNTER — HOSPITAL ENCOUNTER (OUTPATIENT)
Age: 37
End: 2022-01-17
Payer: COMMERCIAL

## 2022-01-17 DIAGNOSIS — Z20.822: Primary | ICD-10-CM

## 2022-01-17 PROCEDURE — 87798 DETECT AGENT NOS DNA AMP: CPT

## 2022-01-17 PROCEDURE — 87581 M.PNEUMON DNA AMP PROBE: CPT

## 2022-01-17 PROCEDURE — U0005 INFEC AGEN DETEC AMPLI PROBE: HCPCS

## 2022-01-17 PROCEDURE — 87632 RESP VIRUS 6-11 TARGETS: CPT

## 2022-01-17 PROCEDURE — C9803 HOPD COVID-19 SPEC COLLECT: HCPCS

## 2022-01-17 PROCEDURE — U0003 INFECTIOUS AGENT DETECTION BY NUCLEIC ACID (DNA OR RNA); SEVERE ACUTE RESPIRATORY SYNDROME CORONAVIRUS 2 (SARS-COV-2) (CORONAVIRUS DISEASE [COVID-19]), AMPLIFIED PROBE TECHNIQUE, MAKING USE OF HIGH THROUGHPUT TECHNOLOGIES AS DESCRIBED BY CMS-2020-01-R: HCPCS

## 2022-01-17 PROCEDURE — 87430 STREP A AG IA: CPT

## 2022-05-05 ENCOUNTER — OFFICE VISIT (OUTPATIENT)
Dept: OBSTETRICS AND GYNECOLOGY | Facility: CLINIC | Age: 37
End: 2022-05-05

## 2022-05-05 VITALS
SYSTOLIC BLOOD PRESSURE: 120 MMHG | WEIGHT: 131 LBS | BODY MASS INDEX: 22.36 KG/M2 | HEIGHT: 64 IN | DIASTOLIC BLOOD PRESSURE: 84 MMHG

## 2022-05-05 DIAGNOSIS — O02.1 MISSED ABORTION: Primary | ICD-10-CM

## 2022-05-05 PROCEDURE — 99215 OFFICE O/P EST HI 40 MIN: CPT | Performed by: NURSE PRACTITIONER

## 2022-05-05 NOTE — PROGRESS NOTES
"Chief Complaint   Patient presents with   • Threatened Miscarriage          HPI  Maddison Wiley is a 37 y.o. female, , who presents with bleeding.  The amount of bleeding is described as heavy for 1 days with clots starting at 2:30pm. Patient reports she had some dark brown discharge when she wiped starting this morning and around 1pm, she started having heavy bright red bleeding.     Recent Tests:  She had a urine pregnancy test that was done 3 weeks ago that was positive..    US today: Yes.  Findings showed gestational sac identified in AZAEL/ cervical region, with FHR= 87bpm.   I have personally evaluated the U/S and agree with the findings.   She has not had prenatal care.  She denies associated abdominal or pelvic pain. Patient states she is having some mild cramping. Her past medical history is non-contributory.  She does not have passage of tissue.  Rh Status: Positive  She reports no additional symptoms or complaints.    The additional following portions of the patient's history were reviewed and updated as appropriate: allergies and current medications.    Review of Systems   Constitutional: Negative.    Cardiovascular: Negative.    Genitourinary: Positive for vaginal bleeding ( moderate bleeding present).     All other systems reviewed and are negative.     I have reviewed and agree with the HPI, ROS, and historical information as entered above. Amie Sung, APRN    Objective   /84 (BP Location: Left arm, Patient Position: Sitting, Cuff Size: Adult)   Ht 162.6 cm (64\")   Wt 59.4 kg (131 lb) Comment: Patient reported  LMP 2022 (Exact Date)   BMI 22.49 kg/m²     Physical Exam  Vitals and nursing note reviewed.   Constitutional:       Appearance: Normal appearance. She is normal weight.   Neurological:      Mental Status: She is alert.            Assessment and Plan    Problem List Items Addressed This Visit    None     Visit Diagnoses     Missed     -  Primary    "       1. Missed AB  2. Ultrasound findings reviewed with pt. Gestational sac identified in AZAEL/cervix region, FHR= 87bpm. Measures 6w should be 6w6d.  3. Patient understands she is in the process of miscarrying. She will call for severe pain or changing full pads every 30 minutes or more.   4.   MBT= O positive  5.   Patient will follow up with  in 6 days with ultrasound. (She was previously scheduled for NOB next week).    Counseling was given to patient for the following topics: diagnostic results, instructions for management, risk factor reductions and prognosis . Total time of the encounter was 50 minutes and 20minutes was spend counseling.      Amie Sung, VAZQUEZ  05/05/2022

## 2022-05-09 ENCOUNTER — TELEPHONE (OUTPATIENT)
Dept: OBSTETRICS AND GYNECOLOGY | Facility: CLINIC | Age: 37
End: 2022-05-09

## 2022-05-09 RX ORDER — HYDROXYZINE PAMOATE 25 MG/1
25 CAPSULE ORAL 3 TIMES DAILY PRN
Qty: 20 CAPSULE | Refills: 0 | Status: SHIPPED | OUTPATIENT
Start: 2022-05-09 | End: 2022-09-21

## 2022-05-09 NOTE — TELEPHONE ENCOUNTER
Patient  called patient has not been doing well with Miscarriage pt  stated he called in Friday spoke with sheree and was called in Zoloft which patient  states is maintenance drug. Patient is is needing something that works now to help with Anxiety before having to come in Wednesday before Ultrsound Patient states he tried to call back but office closed and called twice to on call and was never called back. Please advise

## 2022-05-09 NOTE — TELEPHONE ENCOUNTER
Called patient  gave Lore Lock number for Bereavement  Nena Morrison called in Visteril for patient till she sees Dr. Reed Wednesday   verbalized understanding

## 2022-05-11 ENCOUNTER — OFFICE VISIT (OUTPATIENT)
Dept: OBSTETRICS AND GYNECOLOGY | Facility: CLINIC | Age: 37
End: 2022-05-11

## 2022-05-11 VITALS — SYSTOLIC BLOOD PRESSURE: 112 MMHG | DIASTOLIC BLOOD PRESSURE: 74 MMHG

## 2022-05-11 DIAGNOSIS — O03.9 MISCARRIAGE: Primary | ICD-10-CM

## 2022-05-11 PROCEDURE — 99214 OFFICE O/P EST MOD 30 MIN: CPT | Performed by: OBSTETRICS & GYNECOLOGY

## 2022-05-11 NOTE — PROGRESS NOTES
Chief Complaint   Patient presents with   • Miscarriage          AMANDO Washburn GELACIO Wiley is a 37 y.o. female, , who presents for follow up MAB. Patient reports that she is still bleeding.  The amount of bleeding is described as light for 6 days with clots starting 6 days ago that are now smaller - patient reporting dime size.    Recent Tests:  She had a urine pregnancy test that was done 4 weeks ago that was positive..    US today: Yes.  Findings showed no evidence IUP.  I have personally evaluated the U/S and agree with the findings.   She has not had prenatal care.  She denies associated abdominal or pelvic pain. Patient states she does have some mild cramping. Her past medical history is non-contributory.  She has had passage of tissue.  Rh Status: Positive    Patient reports she has not handled this well and was very anxious. Patient was given Zoloft, but needed additional support. Patient reports she took 1 dose. Patient reports she was on Zoloft in the past and stopped taking it about 6-7 months ago when they were trying to get pregnant. Patient would like to discuss restarting it as they want to try to conceive in the future.  Patient was recently prescribed Vistaril. Patient reports that it just makes her sleepy. Patient reports she just took 1 dose. Patient reports that shehas not reached out to the bereavement counselor. Patient denies any SI or HI.     The additional following portions of the patient's history were reviewed and updated as appropriate: allergies and current medications.    Review of Systems   Constitutional: Negative for fever.   Eyes: Negative for blurred vision.   Cardiovascular: Negative for leg swelling.   Gastrointestinal: Negative for nausea and vomiting.   Genitourinary: Positive for vaginal bleeding. Negative for vaginal discharge.   Neurological: Negative for headache.     All other systems reviewed and are negative.     I have reviewed and agree with the HPI, ROS, and  historical information as entered above. Danni Reed MD    Objective   /74 (BP Location: Left arm, Patient Position: Sitting, Cuff Size: Adult)     Physical Exam  Vitals and nursing note reviewed.   Constitutional:       Appearance: Normal appearance.   HENT:      Head: Normocephalic and atraumatic.   Eyes:      General: No scleral icterus.     Pupils: Pupils are equal, round, and reactive to light.   Pulmonary:      Effort: Pulmonary effort is normal.      Breath sounds: Normal breath sounds.   Abdominal:      General: Bowel sounds are normal. There is no distension.      Palpations: Abdomen is soft.      Tenderness: There is no abdominal tenderness.   Musculoskeletal:         General: Normal range of motion.      Cervical back: Normal range of motion and neck supple.      Right lower leg: No edema.      Left lower leg: No edema.   Skin:     General: Skin is warm and dry.   Neurological:      General: No focal deficit present.      Mental Status: She is alert and oriented to person, place, and time.   Psychiatric:         Mood and Affect: Mood normal.         Behavior: Behavior normal. Behavior is cooperative.            Assessment and Plan    Problem List Items Addressed This Visit    None     Visit Diagnoses     Miscarriage    -  Primary          1. no evidence of IUP seen on the US today.   2. they hope to try again soon. cont PNV   3. Will restart her zoloft, is struggling with anxiety at this time. She did well on zoloft in the past.   No follow-ups on file.     I spent 30 minutes caring for Maddison on this date of service. This time includes time spent by me in the following activities:preparing for the visit, reviewing tests, obtaining and/or reviewing a separately obtained history, counseling and educating the patient/family/caregiver and documenting information in the medical record              Danni Reed MD  05/11/2022

## 2022-08-19 ENCOUNTER — TELEPHONE (OUTPATIENT)
Dept: OBSTETRICS AND GYNECOLOGY | Facility: CLINIC | Age: 37
End: 2022-08-19

## 2022-08-19 NOTE — TELEPHONE ENCOUNTER
[facility]    She had SAB in 5/2022.  She denies vaginal bleeding and pain, but precautions reviewed.  She thinks she would be very early but anxious d/t recent MAB

## 2022-08-19 NOTE — TELEPHONE ENCOUNTER
Pt called and stated she is pregnant and would like a nurse to call her back about some concerns she has about a previous miscarriage.

## 2022-08-29 ENCOUNTER — LAB (OUTPATIENT)
Dept: OBSTETRICS AND GYNECOLOGY | Facility: CLINIC | Age: 37
End: 2022-08-29

## 2022-08-29 DIAGNOSIS — Z32.00 POSSIBLE PREGNANCY, NOT CONFIRMED: Primary | ICD-10-CM

## 2022-08-30 LAB — HCG INTACT+B SERPL-ACNC: NORMAL MIU/ML

## 2022-08-31 ENCOUNTER — TELEPHONE (OUTPATIENT)
Dept: OBSTETRICS AND GYNECOLOGY | Facility: CLINIC | Age: 37
End: 2022-08-31

## 2022-08-31 NOTE — TELEPHONE ENCOUNTER
Pt left VM concerning her HCG lab work. Called pt back.     Pt states she has a MAB in 05/2022 and she did not have any HCG's drawn. Pt reports that her LMP was 7/26/2022. She denies vaginal bleeding, states she has has had some minimal pelvic cramping and is fatigued. Instructed pt she can come in tomorrow for another HCG. Pt states she will come tomorrow for lab work. Told pt if she experiences vaginal bleeding, severe pelvic cramping or has any other concerns to call us. Pt verbalizes understanding.

## 2022-09-01 ENCOUNTER — LAB (OUTPATIENT)
Dept: OBSTETRICS AND GYNECOLOGY | Facility: CLINIC | Age: 37
End: 2022-09-01

## 2022-09-01 DIAGNOSIS — O20.0 THREATENED ABORTION: Primary | ICD-10-CM

## 2022-09-02 ENCOUNTER — TELEPHONE (OUTPATIENT)
Dept: OBSTETRICS AND GYNECOLOGY | Facility: CLINIC | Age: 37
End: 2022-09-02

## 2022-09-02 LAB — HCG INTACT+B SERPL-ACNC: 4903 MIU/ML

## 2022-09-02 NOTE — TELEPHONE ENCOUNTER
Pt called concerning her HCG labs drawn yesterday. Notified pt of results, told her she can repeat Tuesday if she'd like. She is already scheduled for her NOB for 9/21/22.     Instructed pt to go to ED if she experenices severe pelvic cramping or heavy vaginal bleeding. Pt verbalizes understanding and denies additional needs at this time.

## 2022-09-16 ENCOUNTER — TELEPHONE (OUTPATIENT)
Dept: OBSTETRICS AND GYNECOLOGY | Facility: CLINIC | Age: 37
End: 2022-09-16

## 2022-09-16 NOTE — TELEPHONE ENCOUNTER
Dr. Reed NOB patient  LMP: 07/26/2022  NOB: 09/21/2022    S/w patient- patient states that she is not eating much and has been vomiting 2 times per day. Patient reports increase food aversion. Informed patient that Zofran is not recommended early in pregnancy. Encouraged patient to eat small frequent meals every 2-3 hours, increase protein intake, increase water intake, increase Gatorade intake, and can try Vitamin B6 and Unisom to help with nausea. Instructed patient to CB if s/s worsen or do not improve. She v/u.

## 2022-09-16 NOTE — TELEPHONE ENCOUNTER
PT calling to see if it is ok for her to take zofran. She is 7.5 weeks pregnant and having nausea.

## 2022-09-21 ENCOUNTER — OFFICE VISIT (OUTPATIENT)
Dept: OBSTETRICS AND GYNECOLOGY | Facility: CLINIC | Age: 37
End: 2022-09-21

## 2022-09-21 VITALS — BODY MASS INDEX: 22.49 KG/M2 | HEIGHT: 64 IN

## 2022-09-21 DIAGNOSIS — O20.0 THREATENED ABORTION: Primary | ICD-10-CM

## 2022-09-21 PROCEDURE — 99213 OFFICE O/P EST LOW 20 MIN: CPT | Performed by: OBSTETRICS & GYNECOLOGY

## 2022-09-21 RX ORDER — ONDANSETRON 4 MG/1
4 TABLET, FILM COATED ORAL DAILY PRN
Qty: 30 TABLET | Refills: 1 | Status: SHIPPED | OUTPATIENT
Start: 2022-09-21 | End: 2023-09-21

## 2022-09-21 NOTE — PROGRESS NOTES
"    Chief Complaint   Patient presents with   • Threatened Miscarriage          HPI  Maddison Wiley is a 37 y.o. female, , who presents with U/S without fetal heart tones.    Recent Tests:  She had a BHCG 2 weeks ago that was 4,903.    US today: Yes.  Findings showed gs with fetal pole gita. 6w1d, no evidence of cardiac activity, fetal pole appears with possible edema.  There are scattered cystic areas seen throughout [facility].  I have personally evaluated the U/S and agree with the findings.   She has not had prenatal care.  She denies associated abdominal or pelvic pain.. Her past medical history is notable for spontaneous  in May of this year.  She does not have passage of tissue.  Rh Status: Positive  She also complains of nausea and vomiting.    The additional following portions of the patient's history were reviewed and updated as appropriate: allergies, current medications, past family history, past medical history, past social history, past surgical history and problem list.    Review of Systems   Gastrointestinal: Positive for nausea and vomiting.   Psychiatric/Behavioral: The patient is nervous/anxious.      All other systems reviewed and are negative.     I have reviewed and agree with the HPI, ROS, and historical information as entered above. Danni Reed MD    Objective   Ht 162.6 cm (64\")   LMP 2022 (Exact Date)   Breastfeeding No   BMI 22.49 kg/m²     Physical Exam  Vitals and nursing note reviewed.   Constitutional:       Appearance: Normal appearance.   HENT:      Head: Normocephalic and atraumatic.   Eyes:      General: No scleral icterus.     Pupils: Pupils are equal, round, and reactive to light.   Pulmonary:      Effort: Pulmonary effort is normal.      Breath sounds: Normal breath sounds.   Abdominal:      General: Bowel sounds are normal. There is no distension.      Palpations: Abdomen is soft.      Tenderness: There is no abdominal tenderness.   Musculoskeletal:         " General: Normal range of motion.      Cervical back: Normal range of motion and neck supple.      Right lower leg: No edema.      Left lower leg: No edema.   Skin:     General: Skin is warm and dry.   Neurological:      General: No focal deficit present.      Mental Status: She is alert and oriented to person, place, and time.   Psychiatric:         Mood and Affect: Mood normal.         Behavior: Behavior normal. Behavior is cooperative.            Assessment and Plan    Problem List Items Addressed This Visit    None     Visit Diagnoses     Threatened     -  Primary    Relevant Orders    US Ob Transvaginal          1. Threatened AB 2 week lag in LMP and no FHT seen today at 6w1d. Concern for mab but hopefully still early. Will repeat US in 1 week. Precautions given.   2. Pelvic Rest.  No douching, intercourse or use of tampons.  3. Call for an increase in bleeding, abdominal pain, or fever.  Return in about 1 week (around 2022) for Recheck, U/S Next Visit.    I spent 20 minutes caring for Maddison on this date of service. This time includes time spent by me in the following activities:preparing for the visit, reviewing tests, obtaining and/or reviewing a separately obtained history, ordering medications, tests, or procedures and documenting information in the medical record        Danni Reed MD  2022

## 2022-09-28 ENCOUNTER — TELEPHONE (OUTPATIENT)
Dept: OBSTETRICS AND GYNECOLOGY | Facility: CLINIC | Age: 37
End: 2022-09-28

## 2022-09-29 ENCOUNTER — TELEPHONE (OUTPATIENT)
Dept: OBSTETRICS AND GYNECOLOGY | Facility: CLINIC | Age: 37
End: 2022-09-29

## 2022-09-29 NOTE — TELEPHONE ENCOUNTER
Patient reports that she went to an office in [place] - unsure of where and had a bHCG drawn. Patient reports that it was ~14,100. Patient cancelled appt with U/S yesterday. Patient reports that she started having some spotting last night and then starting at 6am this morning, patient reports that she has been passing tissue. Patient reports she is still passing tissue at this time. Patient reports some mild cramping when passing tissue. Patient reports taking Ibuprofen for relief. Advised patient that if she saturates pad in 30 minutes to an hour, has doubling over pain, or exhibits any signs of infection, then she needed to seek ER evaluation. Advised patient that I would speak with Dr. Reed about F/U and notify her of plan. Patient V/U.     Per Dr. Reed, patient can come in next week for bHCG unless she is still having prolonged bleeding. Patient reports that she will come in Thursday for lab draw. Patient advised that if she has prolonged bleeding, to call the office for U/S. Patient V/U     MBT: O+

## 2022-10-06 ENCOUNTER — TELEPHONE (OUTPATIENT)
Dept: OBSTETRICS AND GYNECOLOGY | Facility: CLINIC | Age: 37
End: 2022-10-06

## 2022-10-06 NOTE — TELEPHONE ENCOUNTER
Attempted to call patient regarding her coming in for [facility]. CB.

## 2022-10-10 ENCOUNTER — TELEPHONE (OUTPATIENT)
Dept: OBSTETRICS AND GYNECOLOGY | Facility: CLINIC | Age: 37
End: 2022-10-10

## 2022-12-14 ENCOUNTER — OFFICE VISIT (OUTPATIENT)
Dept: OBSTETRICS AND GYNECOLOGY | Facility: CLINIC | Age: 37
End: 2022-12-14

## 2022-12-14 VITALS
RESPIRATION RATE: 18 BRPM | HEIGHT: 64 IN | SYSTOLIC BLOOD PRESSURE: 104 MMHG | WEIGHT: 129 LBS | DIASTOLIC BLOOD PRESSURE: 62 MMHG | BODY MASS INDEX: 22.02 KG/M2

## 2022-12-14 DIAGNOSIS — Z01.419 ENCOUNTER FOR ANNUAL ROUTINE GYNECOLOGICAL EXAMINATION: Primary | ICD-10-CM

## 2022-12-14 DIAGNOSIS — O03.9 COMPLETE MISCARRIAGE: ICD-10-CM

## 2022-12-14 DIAGNOSIS — F41.8 DEPRESSION WITH ANXIETY: ICD-10-CM

## 2022-12-14 PROBLEM — F41.9 ANXIETY: Status: ACTIVE | Noted: 2022-12-14

## 2022-12-14 PROBLEM — F41.9 ANXIETY: Status: RESOLVED | Noted: 2022-12-14 | Resolved: 2022-12-14

## 2022-12-14 PROCEDURE — 99213 OFFICE O/P EST LOW 20 MIN: CPT | Performed by: NURSE PRACTITIONER

## 2022-12-14 PROCEDURE — 99395 PREV VISIT EST AGE 18-39: CPT | Performed by: NURSE PRACTITIONER

## 2022-12-14 RX ORDER — SERTRALINE HYDROCHLORIDE 100 MG/1
100 TABLET, FILM COATED ORAL DAILY
Qty: 90 TABLET | Refills: 1 | Status: SHIPPED | OUTPATIENT
Start: 2022-12-14

## 2022-12-14 NOTE — PROGRESS NOTES
Gynecologic Annual Exam Note        Gynecologic Exam        Subjective     HPI  Maddison Wiley is a 37 y.o.  female who presents for annual well woman exam as a established patient. There were no changes to her medical or surgical history since her last visit.. Patient reports problems with: heavy bleeding. The patient uses 3 of tampons/pads per hour.. Patient's last menstrual period was 2022 (exact date).. Her periods are regular every 25-35 days, lasting 4 days. The flow is excessive, using 8+ tampons or pads per day. Dysmenorrhea:none. . Partner Status: Marital Status: .  She is sexually active. She has not had new partners.. STD testing recommendations have been explained to the patient and she does not desire STD testing.  She was seen here in 2022.  US showed a MAB.  There has been no follow up since.  She denies possibility of new current pregnancy.  She is unsure if she wants to try to conceive again.  Hx of AB x 2.  She has a hx of depression/anxiety.  She restarted Zoloft earlier this year.    Her mother  after Thanksgiving last month.  She had a long history of chronic medical conditions but her death was sudden.    The death along with her miscarriage in Sept has caused her depression/anxiety to worsen.    Additional OB/GYN History   Current contraception: contraceptive methods: None  Desires to: do not start contraception  Thromboembolic Disease: none  Age of menarche: 12    History of STD: no    Last Pap : 2020. Results: negative. HPV: not done  Last Completed Pap Smear          Ordered - PAP SMEAR (Every 3 Years) Ordered on 2022    11/10/2020  Pap IG, Rfx HPV ASCU                 History of abnormal Pap smear: no  Gardasil status:completed  Family history of uterine, colon, breast, or ovarian cancer: yes - patient believes mother might have had breast cancer  Performs monthly Self-Breast Exam: yes  Exercises Regularly:no  Feelings of Anxiety or  "Depression: yes - Both  Tobacco Usage?: No       Current Outpatient Medications:   •  ondansetron (Zofran) 4 MG tablet, Take 1 tablet by mouth Daily As Needed for Nausea or Vomiting., Disp: 30 tablet, Rfl: 1  •  Prenatal Vit-Fe Fumarate-FA (PRENATAL 27-) 27-1 MG tablet tablet, Take  by mouth Daily., Disp: , Rfl:   •  sertraline (Zoloft) 50 MG tablet, Take 1 tablet by mouth Daily., Disp: 30 tablet, Rfl: 2     Patient is requesting refills of Zoloft.    OB History        4    Para   2    Term   2            AB   1    Living   2       SAB   1    IAB        Ectopic        Molar        Multiple   0    Live Births   2                Health Maintenance   Topic Date Due   • Annual Gynecologic Pelvic and Breast Exam  Never done   • COVID-19 Vaccine (1) Never done   • TDAP/TD VACCINES (1 - Tdap) Never done   • HEPATITIS C SCREENING  Never done   • INFLUENZA VACCINE  Never done   • ANNUAL PHYSICAL  2023   • PAP SMEAR  11/10/2023   • Pneumococcal Vaccine 0-64  Aged Out       Past Medical History:   Diagnosis Date   • Anemia    • Anxiety    • Depression    • Dyspareunia in female    • Herpes genitalis    • Hyperemesis gravidarum    • Migraine    • Ovarian cyst    • Pelvic pain    • Stress incontinence, female         Past Surgical History:   Procedure Laterality Date   • DIAGNOSTIC LAPAROSCOPY EXPLORATORY LAPAROTOMY      for endometriosis   • ENDOMETRIAL ABLATION      LASER OF ENDOMETRIOSIS   • HERNIA REPAIR      x2   • TONSILLECTOMY         The additional following portions of the patient's history were reviewed and updated as appropriate: allergies, current medications, past family history, past medical history, past social history, past surgical history and problem list.    Review of Systems      I have reviewed and agree with the HPI, ROS, and historical information as entered above. Sylwia Gonzalez, APRN        Objective   /62   Resp 18   Ht 162.6 cm (64.02\")   Wt 58.5 kg (129 lb)   LMP " 2022 (Exact Date)   Breastfeeding No   BMI 22.13 kg/m²     Physical Exam  Vitals and nursing note reviewed. Exam conducted with a chaperone present.   Constitutional:       Appearance: She is well-developed.   HENT:      Head: Normocephalic and atraumatic.   Neck:      Thyroid: No thyroid mass or thyromegaly.   Pulmonary:      Effort: Pulmonary effort is normal. No retractions.   Chest:      Chest wall: No mass.   Breasts:     Right: Normal. No mass, nipple discharge, skin change or tenderness.      Left: Normal. No mass, nipple discharge, skin change or tenderness.   Abdominal:      Palpations: Abdomen is soft. Abdomen is not rigid. There is no mass.      Tenderness: There is no abdominal tenderness. There is no guarding.      Hernia: No hernia is present. There is no hernia in the left inguinal area.   Genitourinary:     General: Normal vulva.      Labia:         Right: No rash, tenderness or lesion.         Left: No rash, tenderness or lesion.       Vagina: Normal. No vaginal discharge or lesions.      Cervix: Normal.      Uterus: Normal. Not enlarged, not fixed and not tender.       Adnexa: Right adnexa normal and left adnexa normal.        Right: No mass or tenderness.          Left: No mass or tenderness.        Rectum: No external hemorrhoid.   Musculoskeletal:      Cervical back: Normal range of motion. No muscular tenderness.   Skin:     General: Skin is warm and dry.   Neurological:      Mental Status: She is alert and oriented to person, place, and time.   Psychiatric:         Mood and Affect: Mood normal.         Behavior: Behavior normal.            Assessment and Plan    Problem List Items Addressed This Visit        Gravid and     Complete miscarriage    Relevant Orders    US Non-ob Transvaginal       Mental Health    Depression with anxiety    Relevant Medications    sertraline (Zoloft) 50 MG tablet   Other Visit Diagnoses     Encounter for annual routine gynecological examination     -  Primary    Relevant Orders    LIQUID-BASED PAP SMEAR, [facility] ([facility])    Hemoglobin A1c    CBC & Differential    Comprehensive Metabolic Panel    Vitamin D,25-Hydroxy    TSH    T4, Free          1. GYN annual well woman exam.   2. Reviewed pap guidelines.   3. Reviewed monthly self breast exams.  Instructed to call with lumps, pain, or breast discharge.    4. Reviewed exercise as a preventative health measures.   5. Reccommended Flu Vaccine in Fall of each year.  6. RTC in 1 year or PRN with problems  Return in about 1 year (around 12/14/2023) for Annual physical.   8.   D/w pt US today wnl.  No GS.    9.   D/w pt increase Zoloft dose to 100 mg qd.  Refer to psych.  Let me know if symptoms worsen.        Sylwia Gonzalez, APRN  12/14/2022

## 2022-12-15 LAB
25(OH)D3+25(OH)D2 SERPL-MCNC: 44.5 NG/ML (ref 30–100)
ALBUMIN SERPL-MCNC: 4.8 G/DL (ref 3.5–5.2)
ALBUMIN/GLOB SERPL: 2.7 G/DL
ALP SERPL-CCNC: 105 U/L (ref 39–117)
ALT SERPL-CCNC: 10 U/L (ref 1–33)
AST SERPL-CCNC: 19 U/L (ref 1–32)
BASOPHILS # BLD AUTO: 0.01 10*3/MM3 (ref 0–0.2)
BASOPHILS NFR BLD AUTO: 0.2 % (ref 0–1.5)
BILIRUB SERPL-MCNC: 0.3 MG/DL (ref 0–1.2)
BUN SERPL-MCNC: 8 MG/DL (ref 6–20)
BUN/CREAT SERPL: 12.5 (ref 7–25)
CALCIUM SERPL-MCNC: 9.2 MG/DL (ref 8.6–10.5)
CHLORIDE SERPL-SCNC: 106 MMOL/L (ref 98–107)
CO2 SERPL-SCNC: 29.9 MMOL/L (ref 22–29)
CREAT SERPL-MCNC: 0.64 MG/DL (ref 0.57–1)
EGFRCR SERPLBLD CKD-EPI 2021: 116.2 ML/MIN/1.73
EOSINOPHIL # BLD AUTO: 0.08 10*3/MM3 (ref 0–0.4)
EOSINOPHIL NFR BLD AUTO: 1.9 % (ref 0.3–6.2)
ERYTHROCYTE [DISTWIDTH] IN BLOOD BY AUTOMATED COUNT: 13.7 % (ref 12.3–15.4)
GLOBULIN SER CALC-MCNC: 1.8 GM/DL
GLUCOSE SERPL-MCNC: 90 MG/DL (ref 65–99)
HBA1C MFR BLD: 5.4 % (ref 4.8–5.6)
HCT VFR BLD AUTO: 25.3 % (ref 34–46.6)
HGB BLD-MCNC: 7.7 G/DL (ref 12–15.9)
IMM GRANULOCYTES # BLD AUTO: 0.01 10*3/MM3 (ref 0–0.05)
IMM GRANULOCYTES NFR BLD AUTO: 0.2 % (ref 0–0.5)
LYMPHOCYTES # BLD AUTO: 1.61 10*3/MM3 (ref 0.7–3.1)
LYMPHOCYTES NFR BLD AUTO: 37.5 % (ref 19.6–45.3)
MCH RBC QN AUTO: 26.2 PG (ref 26.6–33)
MCHC RBC AUTO-ENTMCNC: 30.4 G/DL (ref 31.5–35.7)
MCV RBC AUTO: 86.1 FL (ref 79–97)
MONOCYTES # BLD AUTO: 0.46 10*3/MM3 (ref 0.1–0.9)
MONOCYTES NFR BLD AUTO: 10.7 % (ref 5–12)
NEUTROPHILS # BLD AUTO: 2.12 10*3/MM3 (ref 1.7–7)
NEUTROPHILS NFR BLD AUTO: 49.5 % (ref 42.7–76)
NRBC BLD AUTO-RTO: 0 /100 WBC (ref 0–0.2)
PLATELET # BLD AUTO: 374 10*3/MM3 (ref 140–450)
POTASSIUM SERPL-SCNC: 4.7 MMOL/L (ref 3.5–5.2)
PROT SERPL-MCNC: 6.6 G/DL (ref 6–8.5)
RBC # BLD AUTO: 2.94 10*6/MM3 (ref 3.77–5.28)
SODIUM SERPL-SCNC: 142 MMOL/L (ref 136–145)
T4 FREE SERPL-MCNC: 0.99 NG/DL (ref 0.93–1.7)
TSH SERPL DL<=0.005 MIU/L-ACNC: 1.52 UIU/ML (ref 0.27–4.2)
WBC # BLD AUTO: 4.29 10*3/MM3 (ref 3.4–10.8)

## 2022-12-16 ENCOUNTER — TELEPHONE (OUTPATIENT)
Dept: OBSTETRICS AND GYNECOLOGY | Facility: CLINIC | Age: 37
End: 2022-12-16

## 2022-12-16 DIAGNOSIS — Z30.011 ENCOUNTER FOR INITIAL PRESCRIPTION OF CONTRACEPTIVE PILLS: Primary | ICD-10-CM

## 2022-12-16 DIAGNOSIS — D62 ANEMIA DUE TO ACUTE BLOOD LOSS: ICD-10-CM

## 2022-12-16 LAB — REF LAB TEST METHOD: NORMAL

## 2022-12-16 RX ORDER — FERROUS SULFATE 325(65) MG
325 TABLET ORAL 2 TIMES DAILY WITH MEALS
Qty: 60 TABLET | Refills: 3 | Status: SHIPPED | OUTPATIENT
Start: 2022-12-16

## 2022-12-16 RX ORDER — NORETHINDRONE ACETATE AND ETHINYL ESTRADIOL 1MG-20(21)
1 KIT ORAL DAILY
Qty: 84 TABLET | Refills: 0 | Status: SHIPPED | OUTPATIENT
Start: 2022-12-16 | End: 2023-12-16

## 2022-12-16 NOTE — TELEPHONE ENCOUNTER
Pt called regarding lab results. Labs not yet been reviewed by provider. Pap normal with yeast present. Pt reports some vaginal discharge but denies itching or irritation. HGB & HCT low. Instructed pt that we would need to get back with her with providers recommendations for this. Pt Vu. Will await lab review from provider and then follow up with patient.       -DANIELA Badillo

## 2022-12-22 ENCOUNTER — TELEPHONE (OUTPATIENT)
Dept: OBSTETRICS AND GYNECOLOGY | Facility: CLINIC | Age: 37
End: 2022-12-22

## 2022-12-22 DIAGNOSIS — N89.8 VAGINAL DISCHARGE: Primary | ICD-10-CM

## 2022-12-22 DIAGNOSIS — N89.8 VAGINAL ITCHING: ICD-10-CM

## 2022-12-22 RX ORDER — FLUCONAZOLE 150 MG/1
150 TABLET ORAL ONCE
Qty: 2 TABLET | Refills: 0 | Status: SHIPPED | OUTPATIENT
Start: 2022-12-22 | End: 2022-12-22

## 2022-12-22 NOTE — TELEPHONE ENCOUNTER
Pt was seen on 12/14/22. Pt had pap with yeast present. Pt has now developed vaginal discharge and itching. Pt requesting Diflucan to be sent in.  Rx for Diflucan sent to pt preferred pharmacy.      -DANIELA Badillo

## 2023-03-20 ENCOUNTER — TELEPHONE (OUTPATIENT)
Dept: OBSTETRICS AND GYNECOLOGY | Facility: CLINIC | Age: 38
End: 2023-03-20

## 2023-03-20 NOTE — TELEPHONE ENCOUNTER
Caller: Maddison Wiley    Relationship: Self    Best call back number: [phone]    What was the call regarding: PT CALLED IN TO SCHEDULE A NEW OB AND U/S APPT - LMP: 02/19. NO AVAILABILITY WITHIN TIMEFRAME. UNABLE TO WT.     PT STATED SHE HAS HAD 2 MISCARRIAGES IN THE PAST AND IS CURRENTLY TAKING IRON SUPPLEMENTS FOR LOW IRON. UNSURE IF SHE NEEDS TO DO ANYTHING DIFFERENTLY .      Do you require a callback: YES

## 2023-04-17 ENCOUNTER — TELEPHONE (OUTPATIENT)
Dept: OBSTETRICS AND GYNECOLOGY | Facility: CLINIC | Age: 38
End: 2023-04-17

## 2023-04-17 NOTE — TELEPHONE ENCOUNTER
PT'S , FABIANA, REQUESTING TO SPEAK TO A NURSE AND STATES PT HAS HAD TWO MISCARRIAGES IN THE PAST YEAR. PT IS CURRENLTY PREGNANT AGAIN AND PT HAS BEEN VOMITING A LOT AND HAS NOT BEEN ABLE TO KEEP ANY FOOD DOWN. HE ALSO STATES PT HAS NOT BEEN GOING TO THE RESTROOM AS NORMAL AND IS LIGHTHEAD.    HIS BEST CB NUMBER [id number]

## 2023-04-18 ENCOUNTER — TELEPHONE (OUTPATIENT)
Dept: OBSTETRICS AND GYNECOLOGY | Facility: CLINIC | Age: 38
End: 2023-04-18
Payer: COMMERCIAL

## 2023-04-18 RX ORDER — PROMETHAZINE HYDROCHLORIDE 25 MG/1
25 TABLET ORAL EVERY 6 HOURS PRN
Qty: 20 TABLET | Refills: 0 | Status: SHIPPED | OUTPATIENT
Start: 2023-04-18

## 2023-04-18 NOTE — TELEPHONE ENCOUNTER
PT'S , FABIANA, REQUESTING TO SPEAK TO A NURSE AND STATES PT HAS HAD TWO MISCARRIAGES IN THE PAST YEAR. PT IS CURRENLTY PREGNANT AGAIN AND PT HAS BEEN VOMITING A LOT AND HAS NOT BEEN ABLE TO KEEP ANY FOOD DOWN. HE ALSO STATES PT HAS NOT BEEN GOING TO THE RESTROOM AS NORMAL AND IS LIGHTHEAD.     HIS BEST CB NUMBER [phone]

## 2023-04-18 NOTE — TELEPHONE ENCOUNTER
Instructed to go to ER for fluids.     She also reports a lot of drainage that she thinks is allergies. Informed she can take zyrtec, Claritin, and Flonase for this. She VU      She questions is she is able to hold down something today if she would need to go to the ER. Informed if she doesn't have urine output by this evening she needs to go in. If she unable to hold things down throughout the day, she needs to go in to the ER. She VU     Per KN: can also call in phenergan. Pt notified.

## 2023-04-19 ENCOUNTER — TELEPHONE (OUTPATIENT)
Dept: OBSTETRICS AND GYNECOLOGY | Facility: CLINIC | Age: 38
End: 2023-04-19

## 2023-04-19 NOTE — TELEPHONE ENCOUNTER
Caller: FABIANA SIMS    Relationship: SPOUSE    Best call back number:  [phone] OR  # [phone]    What is the best time to reach you: ANY AND MAY LEAVE V/M    What was the call regarding:  STATES THEY CALLED YESTERDAY REGARDING N/V AND WAS ADVISED TO GO TO ER FOR FLUIDS. THEY JUST STAYED HOME AND WATCHED HER. SHE IS READY TO GO TO HOSPITAL NOW AND IS INQUIRING IF THE ORDER CAN BE SENT TO [facility] INSTEAD. UNABLE TO WT.     Do you require a callback: YES

## 2023-04-24 ENCOUNTER — TELEPHONE (OUTPATIENT)
Dept: OBSTETRICS AND GYNECOLOGY | Facility: CLINIC | Age: 38
End: 2023-04-24
Payer: COMMERCIAL

## 2023-04-24 NOTE — TELEPHONE ENCOUNTER
"  Caller: Maddison iWley    Relationship: Self    Best call back number: [phone]    What is the best time to reach you: ANY AND MAY LEAVE V/M    What was the call regarding: PT CALLING REQUESTING TO GET A COPY OF HER [facility] PAPER WORK THAT WAS SENT 4/19/23. ASLO REQUESTING TO SPEAK WITH SOMEONE AS HER EMPLOYER [employer] IS STATING THEY NEED A LETTER AS WELL ATTESTING TO THE PAPERWORK. SHE IS UNSURE WHAT ELSE SHE WOULD NEED, SO REQUESTING CALL BACK AS THEY ARE \"GIVING ME A HARD TIME ABOUT IT.\"    Do you require a callback: YES        "

## 2023-04-26 ENCOUNTER — ROUTINE PRENATAL (OUTPATIENT)
Dept: OBSTETRICS AND GYNECOLOGY | Facility: CLINIC | Age: 38
End: 2023-04-26
Payer: COMMERCIAL

## 2023-04-26 ENCOUNTER — TELEPHONE (OUTPATIENT)
Dept: OBSTETRICS AND GYNECOLOGY | Facility: CLINIC | Age: 38
End: 2023-04-26

## 2023-04-26 VITALS — DIASTOLIC BLOOD PRESSURE: 60 MMHG | BODY MASS INDEX: 21.82 KG/M2 | SYSTOLIC BLOOD PRESSURE: 100 MMHG | WEIGHT: 127.2 LBS

## 2023-04-26 DIAGNOSIS — O98.319 GENITAL HERPES SIMPLEX VIRUS (HSV) INFECTION IN MOTHER AFFECTING PREGNANCY: ICD-10-CM

## 2023-04-26 DIAGNOSIS — O09.529 ANTEPARTUM MULTIGRAVIDA OF ADVANCED MATERNAL AGE: Primary | ICD-10-CM

## 2023-04-26 DIAGNOSIS — Z3A.09 9 WEEKS GESTATION OF PREGNANCY: ICD-10-CM

## 2023-04-26 DIAGNOSIS — E86.0 DEHYDRATION DURING PREGNANCY: ICD-10-CM

## 2023-04-26 DIAGNOSIS — F41.8 DEPRESSION WITH ANXIETY: ICD-10-CM

## 2023-04-26 DIAGNOSIS — A60.09 GENITAL HERPES SIMPLEX VIRUS (HSV) INFECTION IN MOTHER AFFECTING PREGNANCY: ICD-10-CM

## 2023-04-26 DIAGNOSIS — O99.280 DEHYDRATION DURING PREGNANCY: ICD-10-CM

## 2023-04-26 PROBLEM — Z34.90 PREGNANCY: Status: ACTIVE | Noted: 2023-04-26

## 2023-04-26 PROBLEM — O03.9 COMPLETE MISCARRIAGE: Status: RESOLVED | Noted: 2022-12-14 | Resolved: 2023-04-26

## 2023-04-26 PROCEDURE — 0501F PRENATAL FLOW SHEET: CPT | Performed by: OBSTETRICS & GYNECOLOGY

## 2023-04-26 RX ORDER — PROMETHAZINE HYDROCHLORIDE 12.5 MG/1
TABLET ORAL
COMMUNITY

## 2023-04-26 RX ORDER — ONDANSETRON 4 MG/1
4 TABLET, FILM COATED ORAL DAILY PRN
Qty: 30 TABLET | Refills: 1 | Status: SHIPPED | OUTPATIENT
Start: 2023-04-26 | End: 2024-04-25

## 2023-04-26 RX ORDER — ONDANSETRON 4 MG/1
4 TABLET, ORALLY DISINTEGRATING ORAL EVERY 8 HOURS PRN
Qty: 20 TABLET | Refills: 1 | Status: SHIPPED | OUTPATIENT
Start: 2023-04-26

## 2023-04-26 NOTE — TELEPHONE ENCOUNTER
Caller: Boubacar Wiley    Relationship to patient: Emergency Contact    Best call back number: [phone]    Patient is needing: PATIENT PREFERS PILL PRESCRIPTION FOR ZOFRAN TO BE SENT TO PHARMACY INSTEAD OF THE DISSOLVABLE FORM.       PATIENT IS WANTING MORE INFORMATION ON BLOOD-GENDER TESTING.

## 2023-04-26 NOTE — PROGRESS NOTES
Initial ob visit     CC- Here for care of pregnancy        Maddison Wiley is a 38 y.o. female, , who presents for her first obstetrical visit.   Patient's last menstrual period was 2023.. Her KT is 2023, by Last Menstrual Period. Current GA is 9w3d. She went to [facility] last Wednesday for fluids.      Initial positive test date : 3-, UPT        Her periods are: every 4 weeks  Prior obstetric issues: none  Patient's past medical history is significant for: anxiety/depression .  Family history of genetic issues (includes FOB): no  Prior infections concerning in pregnancy (Rash, fever in last 2 weeks): No  Varicella Hx - history of chicken pox  Prior testing for Cystic Fibrosis Carrier or Sickle Cell Trait-   Prepregnancy BMI - Body mass index is 21.82 kg/m².  Hx of HSV for patient or partner: yes - .  Ultrasound Today: yes; viable iup gita. 9w2d    OB History    Para Term  AB Living   5 2 2   2 2   SAB IAB Ectopic Molar Multiple Live Births   2       0 2      # Outcome Date GA Lbr Shilo/2nd Weight Sex Delivery Anes PTL Lv   5 Current            4 SAB 22           3 Term 17 39w3d 04:03 / 00:26 3255 g (7 lb 2.8 oz) M Vag-Spont EPI N MAR YANN   2 Term 12 40w0d  2863 g (6 lb 5 oz) M Vag-Spont EPI N MARY ANN   1 SAB                Additional Pertinent History   Last Pap :  Result: negative HPV: negative     Last Completed Pap Smear          PAP SMEAR (Every 3 Years) Next due on 2025  LIQUID-BASED PAP SMEAR, [facility] ([facility])    11/10/2020  Pap IG, Rfx HPV ASCU              History of abnormal Pap smear: no  Family history of uterine, colon, breast, or ovarian cancer: no  Feelings of Anxiety or Depression: yes - zoloft for anxiety and depression   Tobacco Usage?: No   Alcohol/Drug Use?: NO  Over the age of 35 at delivery: yes  Desires Genetic Screening: None      PMH    Current Outpatient Medications:   •  ferrous sulfate 325 (65 FE) MG  tablet, Take 1 tablet by mouth 2 (Two) Times a Day With Meals., Disp: 60 tablet, Rfl: 3  •  promethazine (PHENERGAN) 12.5 MG tablet, promethazine 12.5 mg tablet, Disp: , Rfl:   •  sertraline (Zoloft) 100 MG tablet, Take 1 tablet by mouth Daily. (Patient taking differently: Take 50 mg by mouth Daily.), Disp: 90 tablet, Rfl: 1  •  ondansetron (Zofran) 4 MG tablet, Take 1 tablet by mouth Daily As Needed for Nausea or Vomiting. (Patient not taking: Reported on 4/26/2023), Disp: 30 tablet, Rfl: 1  •  ondansetron ODT (ZOFRAN-ODT) 4 MG disintegrating tablet, Place 1 tablet on the tongue Every 8 (Eight) Hours As Needed for Nausea or Vomiting., Disp: 20 tablet, Rfl: 1  •  Prenatal Vit-Fe Fumarate-FA (PRENATAL 27-1) 27-1 MG tablet tablet, Take  by mouth Daily. (Patient not taking: Reported on 4/26/2023), Disp: , Rfl:   •  promethazine (PHENERGAN) 25 MG tablet, Take 1 tablet by mouth Every 6 (Six) Hours As Needed for Nausea or Vomiting., Disp: 20 tablet, Rfl: 0     Past Medical History:   Diagnosis Date   • Anemia    • Anxiety    • Depression    • Dyspareunia in female    • Herpes genitalis    • Hyperemesis gravidarum    • Migraine    • Ovarian cyst    • Pelvic pain    • Stress incontinence, female         Past Surgical History:   Procedure Laterality Date   • DIAGNOSTIC LAPAROSCOPY EXPLORATORY LAPAROTOMY      for endometriosis   • HERNIA REPAIR      x2   • TONSILLECTOMY         Review of Systems   Review of Systems  Patient Reports: Nausea and vomiting  Patient Denies: Spotting, Heavy bleeding, Cramping and Fatigue  All systems reviewed and otherwise normal.    I have reviewed and agree with the HPI, ROS, and historical information as entered above. Danni Reed MD    /60   Wt 57.7 kg (127 lb 3.2 oz)   LMP 02/19/2023   BMI 21.82 kg/m²     The additional following portions of the patient's history were reviewed and updated as appropriate: allergies, current medications, past family history, past medical history, past  social history, past surgical history and problem list.    Physical Exam  General:  well developed; well nourished  no acute distress   Chest/Respiratory: No labored breathing, normal respiratory effort, normal appearance, no respiratory noises noted   Heart:  not examined   Thyroid: not examined   Breasts:  Not performed.   Abdomen: soft, non-tender; no masses  no umbilical or inguinal hernias are present  no hepato-splenomegaly   Pelvis: Not performed.        Assessment and Plan    Problem List Items Addressed This Visit        Gynecologic and Obstetric Problems    Genital herpes simplex virus (HSV) infection in mother affecting pregnancy    Overview     suppression 36 wks            Other    Depression with anxiety    Relevant Medications    sertraline (Zoloft) 100 MG tablet    Pregnancy    Overview     2 prev  term           Antepartum multigravida of advanced maternal age - Primary    Relevant Orders    Obstetric Panel    Chlamydia trachomatis, Neisseria gonorrhoeae, PCR w/ confirmation - Urine, Urine, Clean Catch    HIV-1 / O / 2 Ag / Antibody 4th Generation    Urinalysis With Microscopic - Urine, Clean Catch    Urine Culture - Urine, Urine, Clean Catch    Urine Drug Screen - Urine, Clean Catch       1. Pregnancy at 9w3d  2. Reviewed routine prenatal care with the office and educational materials given  3. Lab(s) Ordered  4. Discussed options for genetic testing including first trimester nuchal translucency screen, genetic disease carrier testing, quadruple screen, and NIPT  5. Nausea/Vomiting - desires medication.  Options discussed and encouraged to be proactive to avoid constipation if on Zofran.  6. Patient is on Prenatal vitamins  7. Activity recommendation : 150 minutes/week of moderate intensity aerobic activity unless we limit for bleeding, hypertension or other pregnancy complication   Return in about 1 month (around 2023) for F/U Prenatal.      Danni Reed MD  2023

## 2023-04-27 LAB
ABO GROUP BLD: ABNORMAL
BASOPHILS # BLD AUTO: 0 X10E3/UL (ref 0–0.2)
BASOPHILS NFR BLD AUTO: 0 %
BLD GP AB SCN SERPL QL: NEGATIVE
EOSINOPHIL # BLD AUTO: 0.1 X10E3/UL (ref 0–0.4)
EOSINOPHIL NFR BLD AUTO: 1 %
ERYTHROCYTE [DISTWIDTH] IN BLOOD BY AUTOMATED COUNT: 12.9 % (ref 11.7–15.4)
HBV SURFACE AG SERPL QL IA: NEGATIVE
HCT VFR BLD AUTO: 33.5 % (ref 34–46.6)
HCV IGG SERPL QL IA: NON REACTIVE
HGB BLD-MCNC: 11.6 G/DL (ref 11.1–15.9)
HIV 1+2 AB+HIV1 P24 AG SERPL QL IA: NON REACTIVE
IMM GRANULOCYTES # BLD AUTO: 0.1 X10E3/UL (ref 0–0.1)
IMM GRANULOCYTES NFR BLD AUTO: 1 %
LYMPHOCYTES # BLD AUTO: 1.4 X10E3/UL (ref 0.7–3.1)
LYMPHOCYTES NFR BLD AUTO: 16 %
MCH RBC QN AUTO: 31.9 PG (ref 26.6–33)
MCHC RBC AUTO-ENTMCNC: 34.6 G/DL (ref 31.5–35.7)
MCV RBC AUTO: 92 FL (ref 79–97)
MONOCYTES # BLD AUTO: 0.7 X10E3/UL (ref 0.1–0.9)
MONOCYTES NFR BLD AUTO: 8 %
NEUTROPHILS # BLD AUTO: 7 X10E3/UL (ref 1.4–7)
NEUTROPHILS NFR BLD AUTO: 74 %
PLATELET # BLD AUTO: 301 X10E3/UL (ref 150–450)
RBC # BLD AUTO: 3.64 X10E6/UL (ref 3.77–5.28)
RH BLD: POSITIVE
RPR SER QL: NON REACTIVE
RUBV IGG SERPL IA-ACNC: 1.01 INDEX
WBC # BLD AUTO: 9.3 X10E3/UL (ref 3.4–10.8)

## 2023-05-01 LAB
BACTERIA UR CULT: NO GROWTH
BACTERIA UR CULT: NORMAL
C TRACH RRNA SPEC QL NAA+PROBE: NEGATIVE
N GONORRHOEA RRNA SPEC QL NAA+PROBE: NEGATIVE

## 2023-05-24 ENCOUNTER — ROUTINE PRENATAL (OUTPATIENT)
Dept: OBSTETRICS AND GYNECOLOGY | Facility: CLINIC | Age: 38
End: 2023-05-24
Payer: COMMERCIAL

## 2023-05-24 VITALS — WEIGHT: 128.2 LBS | SYSTOLIC BLOOD PRESSURE: 90 MMHG | BODY MASS INDEX: 21.99 KG/M2 | DIASTOLIC BLOOD PRESSURE: 50 MMHG

## 2023-05-24 DIAGNOSIS — O09.529 ANTEPARTUM MULTIGRAVIDA OF ADVANCED MATERNAL AGE: ICD-10-CM

## 2023-05-24 DIAGNOSIS — O98.319 GENITAL HERPES SIMPLEX VIRUS (HSV) INFECTION IN MOTHER AFFECTING PREGNANCY: ICD-10-CM

## 2023-05-24 DIAGNOSIS — O09.42 SUPERVISION OF HIGH-RISK PREGNANCY WITH GRAND MULTIPARITY IN SECOND TRIMESTER: Primary | ICD-10-CM

## 2023-05-24 DIAGNOSIS — A60.09 GENITAL HERPES SIMPLEX VIRUS (HSV) INFECTION IN MOTHER AFFECTING PREGNANCY: ICD-10-CM

## 2023-05-24 DIAGNOSIS — Z3A.13 13 WEEKS GESTATION OF PREGNANCY: ICD-10-CM

## 2023-05-24 DIAGNOSIS — F41.8 DEPRESSION WITH ANXIETY: ICD-10-CM

## 2023-05-24 PROCEDURE — 0502F SUBSEQUENT PRENATAL CARE: CPT | Performed by: OBSTETRICS & GYNECOLOGY

## 2023-05-24 NOTE — PROGRESS NOTES
OB FOLLOW UP  CC- Here for care of pregnancy        Maddison Wiley is a 38 y.o.  13w3d patient being seen today for her obstetrical follow up visit. Patient reports headaches, n/v.  Nausea has improved with Zofran but still vomiting at least once daily.  Patient declined cfdna.      Her prenatal care is complicated by (and status) :   Patient Active Problem List   Diagnosis   • Depression with anxiety   • Pregnancy   • Antepartum multigravida of advanced maternal age   • Genital herpes simplex virus (HSV) infection in mother affecting pregnancy       Desires genetic testing?: No  Ultrasound Today: No    ROS -   Patient Denies: Loss of Fluid and Vaginal Spotting  All other systems reviewed and are negative.     The additional following portions of the patient's history were reviewed and updated as appropriate: allergies, current medications, past family history, past medical history, past social history, past surgical history and problem list.    I have reviewed and agree with the HPI, ROS, and historical information as entered above. Danni Reed MD          BP 90/50   Wt 58.2 kg (128 lb 3.2 oz)   LMP 2023   BMI 21.99 kg/m²         EXAM:     Prenatal Vitals  BP: 90/50  Weight: 58.2 kg (128 lb 3.2 oz)   Fetal Heart Rate: +                  Assessment and Plan    Problem List Items Addressed This Visit        Gynecologic and Obstetric Problems    Genital herpes simplex virus (HSV) infection in mother affecting pregnancy    Overview     suppression 36 wks            Other    Depression with anxiety    Relevant Medications    sertraline (Zoloft) 100 MG tablet    Pregnancy    Overview     2 prev  term           Antepartum multigravida of advanced maternal age   Other Visit Diagnoses     Supervision of high-risk pregnancy with grand multiparity in second trimester    -  Primary    Relevant Orders    POC Urinalysis Dipstick          1. Pregnancy at 13w3d  2. Labs reviewed from New OB  Visit.  3. Counseled on genetic testing, carrier status and option for NT screen- declines  4. Activity and Exercise discussed.  5. Patient is on Prenatal vitamins  Return in about 1 month (around 6/24/2023) for F/U Prenatal.    Danni Reed MD  05/24/2023

## 2023-06-16 ENCOUNTER — TELEPHONE (OUTPATIENT)
Dept: OBSTETRICS AND GYNECOLOGY | Facility: CLINIC | Age: 38
End: 2023-06-16

## 2023-06-16 NOTE — TELEPHONE ENCOUNTER
Called patient back she denies any bleeding or abdominal pain. Patient to go to ED or labor and delivery if she experiences any bleeding or abdominal pain recommended patient lay down on left side and rest for the day

## 2023-06-16 NOTE — TELEPHONE ENCOUNTER
PT'S  CALLING, OB PT 16W5D, JUST A FALL INCIDENT ABOUT 10 MINUTES, STATES SHE FELL PRETTY HARD STRAIGHT ON HER BOTTOM, MILDLY INJURING RIGHT WRIST. UNABLE TO WT     PLEASE CONTACT PHONE NUMBER [phone]

## 2023-08-09 ENCOUNTER — ROUTINE PRENATAL (OUTPATIENT)
Dept: OBSTETRICS AND GYNECOLOGY | Facility: CLINIC | Age: 38
End: 2023-08-09
Payer: COMMERCIAL

## 2023-08-09 VITALS — SYSTOLIC BLOOD PRESSURE: 90 MMHG | DIASTOLIC BLOOD PRESSURE: 70 MMHG | BODY MASS INDEX: 22.99 KG/M2 | WEIGHT: 134 LBS

## 2023-08-09 DIAGNOSIS — O09.529 ANTEPARTUM MULTIGRAVIDA OF ADVANCED MATERNAL AGE: ICD-10-CM

## 2023-08-09 DIAGNOSIS — Z3A.24 24 WEEKS GESTATION OF PREGNANCY: Primary | ICD-10-CM

## 2023-08-09 DIAGNOSIS — O98.319 GENITAL HERPES SIMPLEX VIRUS (HSV) INFECTION IN MOTHER AFFECTING PREGNANCY: ICD-10-CM

## 2023-08-09 DIAGNOSIS — A60.09 GENITAL HERPES SIMPLEX VIRUS (HSV) INFECTION IN MOTHER AFFECTING PREGNANCY: ICD-10-CM

## 2023-08-09 DIAGNOSIS — F41.8 DEPRESSION WITH ANXIETY: ICD-10-CM

## 2023-08-09 PROCEDURE — 0502F SUBSEQUENT PRENATAL CARE: CPT | Performed by: OBSTETRICS & GYNECOLOGY

## 2023-08-09 NOTE — PROGRESS NOTES
OB FOLLOW UP  CC- Here for care of pregnancy        Maddison Wiley is a 38 y.o.  24w3d patient being seen today for her obstetrical follow up visit. Patient reports irregular contractions . Had 2 intense lainey mejia last two mornings    Her prenatal care is complicated by (and status) : None  Patient Active Problem List   Diagnosis    Depression with anxiety    Pregnancy    Antepartum multigravida of advanced maternal age    Genital herpes simplex virus (HSV) infection in mother affecting pregnancy       Flu Status: Declines  Ultrasound Today: No    ROS -   Patient Reports : No Problems  Patient Denies: Loss of Fluid, Vaginal Spotting, Vision Changes, Headaches, Nausea , and Vomiting   Fetal Movement : normal  All other systems reviewed and are negative.       The additional following portions of the patient's history were reviewed and updated as appropriate: allergies and current medications.      I have reviewed and agree with the HPI, ROS, and historical information as entered above. Danni Reed MD      BP 90/70   Wt 60.8 kg (134 lb)   LMP 2023   BMI 22.99 kg/mý       EXAM:     Prenatal Vitals  BP: 90/70  Weight: 60.8 kg (134 lb)   Fetal Heart Rate: +      Fundal Height (cm): 24 cm                Assessment and Plan    Problem List Items Addressed This Visit          Gynecologic and Obstetric Problems    Genital herpes simplex virus (HSV) infection in mother affecting pregnancy    Overview     suppression 36 wks            Other    Depression with anxiety    Relevant Medications    sertraline (Zoloft) 100 MG tablet    Pregnancy - Primary    Overview     2 prev  term           Relevant Orders    POC Glucose, Urine, Qualitative, Dipstick    POC Protein, Urine, Qualitative, Dipstick    Antepartum multigravida of advanced maternal age       Pregnancy at 24w3d  Fetal status reassuring.  No US indicated today.  1 hour gtt, CBC, Antibody screen, and TDAP next visit. Instructions  given  Discussed/encouraged TDAP vaccination after 28 weeks  Activity and Exercise discussed.  Return in about 1 month (around 9/9/2023) for F/U Prenatal, and glucola.    Danni Reed MD  08/09/2023

## 2023-08-23 ENCOUNTER — TELEPHONE (OUTPATIENT)
Dept: OBSTETRICS AND GYNECOLOGY | Facility: CLINIC | Age: 38
End: 2023-08-23
Payer: COMMERCIAL

## 2023-09-06 ENCOUNTER — ROUTINE PRENATAL (OUTPATIENT)
Dept: OBSTETRICS AND GYNECOLOGY | Facility: CLINIC | Age: 38
End: 2023-09-06
Payer: COMMERCIAL

## 2023-09-06 VITALS — WEIGHT: 133 LBS | BODY MASS INDEX: 22.82 KG/M2 | DIASTOLIC BLOOD PRESSURE: 72 MMHG | SYSTOLIC BLOOD PRESSURE: 110 MMHG

## 2023-09-06 DIAGNOSIS — F41.8 DEPRESSION WITH ANXIETY: ICD-10-CM

## 2023-09-06 DIAGNOSIS — Z3A.28 28 WEEKS GESTATION OF PREGNANCY: ICD-10-CM

## 2023-09-06 DIAGNOSIS — Z13.1 ENCOUNTER FOR SCREENING FOR DIABETES MELLITUS: Primary | ICD-10-CM

## 2023-09-06 DIAGNOSIS — O09.529 ANTEPARTUM MULTIGRAVIDA OF ADVANCED MATERNAL AGE: ICD-10-CM

## 2023-09-06 LAB
EXPIRATION DATE: 1
GLUCOSE UR STRIP-MCNC: NEGATIVE MG/DL
Lab: 1
PROT UR STRIP-MCNC: ABNORMAL MG/DL

## 2023-09-06 NOTE — PROGRESS NOTES
OB FOLLOW UP  CC- Here for care of pregnancy        Maddison Wiley is a 38 y.o.  28w3d patient being seen today for her obstetrical follow up. Patient reports nausea without vomiting for 4-5 days days. Having a lot of round ligament pain on both sides  had more episodes    Patient undergoing Glucola testing today. She is due for her testing at 1040.       MBT: O+  Rhogam:  na  28 week packet: reviewed with patient  and counseled on fetal movement   TDAP: undecided  Flu Status: Declines  Ultrasound Today: No    Her prenatal care is complicated by (and status) :  None  Patient Active Problem List   Diagnosis    Depression with anxiety    Pregnancy    Antepartum multigravida of advanced maternal age    Genital herpes simplex virus (HSV) infection in mother affecting pregnancy         ROS -   Patient Reports : Nausea and round ligament pain  Patient Denies: Loss of Fluid, Vaginal Spotting, Vision Changes, Headaches, Vomiting , and Contractions  Fetal Movement : normal    The additional following portions of the patient's history were reviewed and updated as appropriate: allergies and current medications.    I have reviewed and agree with the HPI, ROS, and historical information as entered above. Danni Reed MD      /72   Wt 60.3 kg (133 lb)   LMP 2023   BMI 22.82 kg/m²         EXAM:     Prenatal Vitals  BP: 110/72  Weight: 60.3 kg (133 lb)   Fetal Heart Rate: +      Fundal Height (cm): 27 cm        Urine Glucose Read-only: Negative  Urine Protein Read-only: (!) 1+         Assessment and Plan    Problem List Items Addressed This Visit       Depression with anxiety    Relevant Medications    sertraline (Zoloft) 100 MG tablet    Pregnancy    Overview     2 prev  term           Relevant Orders    POC Glucose, Urine, Qualitative, Dipstick (Completed)    POC Protein, Urine, Qualitative, Dipstick (Completed)    Antepartum multigravida of advanced maternal age     Other Visit Diagnoses        Encounter for screening for diabetes mellitus    -  Primary    Relevant Orders    CBC (No Diff)    Gestational Screen 1 Hr ([facility])    Antibody Screen            Pregnancy at 28w3d. Plan 32 wk US here instead of PDC dt cost  1 hr Glucola, CBC, and antibody screen today  and TDAP given today  Fetal movement/PTL or Labor precautions  Activity and Exercise discussed.  Return in about 1 month (around 10/6/2023) for F/U Prenatal.    Danni Reed MD  09/06/2023

## 2023-09-07 LAB
BLD GP AB SCN SERPL QL: NEGATIVE
ERYTHROCYTE [DISTWIDTH] IN BLOOD BY AUTOMATED COUNT: 11.8 % (ref 12.3–15.4)
GLUCOSE 1H P 50 G GLC PO SERPL-MCNC: 107 MG/DL (ref 65–139)
HCT VFR BLD AUTO: 30.1 % (ref 34–46.6)
HGB BLD-MCNC: 10.3 G/DL (ref 12–15.9)
MCH RBC QN AUTO: 31.3 PG (ref 26.6–33)
MCHC RBC AUTO-ENTMCNC: 34.2 G/DL (ref 31.5–35.7)
MCV RBC AUTO: 91.5 FL (ref 79–97)
PLATELET # BLD AUTO: 260 10*3/MM3 (ref 140–450)
RBC # BLD AUTO: 3.29 10*6/MM3 (ref 3.77–5.28)
WBC # BLD AUTO: 10.56 10*3/MM3 (ref 3.4–10.8)

## 2023-09-18 ENCOUNTER — TELEPHONE (OUTPATIENT)
Dept: OBSTETRICS AND GYNECOLOGY | Facility: CLINIC | Age: 38
End: 2023-09-18
Payer: COMMERCIAL

## 2023-09-18 NOTE — TELEPHONE ENCOUNTER
Patient called requesting her lab results. I have advised the patient per Dr Reed. Patient would like to do the IV Iron Infusions. Patient also stated she is having some yellow discharge with itching and odor. Patient is scheduled to come in, in the morning to see Nena SHUKLA. Patient v/u.

## 2023-09-19 ENCOUNTER — ROUTINE PRENATAL (OUTPATIENT)
Dept: OBSTETRICS AND GYNECOLOGY | Facility: CLINIC | Age: 38
End: 2023-09-19
Payer: COMMERCIAL

## 2023-09-19 ENCOUNTER — TELEPHONE (OUTPATIENT)
Dept: OBSTETRICS AND GYNECOLOGY | Facility: CLINIC | Age: 38
End: 2023-09-19
Payer: COMMERCIAL

## 2023-09-19 VITALS — DIASTOLIC BLOOD PRESSURE: 75 MMHG | SYSTOLIC BLOOD PRESSURE: 112 MMHG | BODY MASS INDEX: 22.89 KG/M2 | WEIGHT: 133.4 LBS

## 2023-09-19 DIAGNOSIS — N89.8 VAGINAL DISCHARGE: ICD-10-CM

## 2023-09-19 DIAGNOSIS — D50.9 IRON DEFICIENCY ANEMIA DURING PREGNANCY: Primary | ICD-10-CM

## 2023-09-19 DIAGNOSIS — Z34.83 PRENATAL CARE, SUBSEQUENT PREGNANCY, THIRD TRIMESTER: Primary | ICD-10-CM

## 2023-09-19 DIAGNOSIS — O99.019 IRON DEFICIENCY ANEMIA DURING PREGNANCY: Primary | ICD-10-CM

## 2023-09-19 DIAGNOSIS — R11.2 NAUSEA AND VOMITING, UNSPECIFIED VOMITING TYPE: ICD-10-CM

## 2023-09-19 DIAGNOSIS — O99.019 MATERNAL ANEMIA IN PREGNANCY, ANTEPARTUM: Primary | ICD-10-CM

## 2023-09-19 LAB
EXPIRATION DATE: 0
FERRITIN SERPL-MCNC: 10.9 NG/ML (ref 13–150)
GLUCOSE UR STRIP-MCNC: NEGATIVE MG/DL
IRON SATN MFR SERPL: 5 % (ref 20–50)
IRON SERPL-MCNC: 42 MCG/DL (ref 37–145)
KOH PREP NAIL: ABNORMAL
Lab: 0
PROT UR STRIP-MCNC: ABNORMAL MG/DL
TIBC SERPL-MCNC: 796 MCG/DL
UIBC SERPL-MCNC: 754 MCG/DL (ref 112–346)
WET PREP GENITAL: NORMAL

## 2023-09-19 NOTE — TELEPHONE ENCOUNTER
I left message for patient on VM that she will need to come in for additional lab work required prior to scheduling or receiving iron infusion.  (Patient was notified by Alexandra SANCHEZ Yesterday of results and desires iron infusion)   patient being seen in [place] office today

## 2023-09-19 NOTE — PROGRESS NOTES
OB FOLLOW UP  CC- Here for care of pregnancy        Maddison Wiley is a 38 y.o.  30w2d patient being seen today for vaginal discharge that is white to yellow in color with itching and possible odor. She reports nausea with vomiting every time she eats and drinks. She also reports BH contractions.    Her prenatal care is complicated by (and status) :    Patient Active Problem List   Diagnosis    Depression with anxiety    Pregnancy    Antepartum multigravida of advanced maternal age    Genital herpes simplex virus (HSV) infection in mother affecting pregnancy         Ultrasound Today: No.    ROS -   Patient Reports :  vaginal discharge, itching and odor  , BH contractions and nausea and vomiting  Patient Denies: Loss of Fluid, Vaginal Spotting, Vision Changes, and Headaches  Fetal Movement : decreased  All other systems reviewed and are negative.     Reason for test:  DFM  Date of Test: 2023  Time frame of test: 20 mins    NST Interpretation:         The additional following portions of the patient's history were reviewed and updated as appropriate: allergies and current medications.    I have reviewed and agree with the HPI, ROS, and historical information as entered above. Nena Morrison, APRN      /75   Wt 60.5 kg (133 lb 6.4 oz)   LMP 2023   BMI 22.89 kg/m²       EXAM:     Prenatal Vitals  BP: 112/75  Weight: 60.5 kg (133 lb 6.4 oz)   Fetal Heart Rate: NST                Assessment and Plan    Problem List Items Addressed This Visit    None  Visit Diagnoses       Prenatal care, subsequent pregnancy, third trimester    -  Primary    Relevant Orders    POC Protein, Urine, Qualitative, Dipstick (Completed)    POC Glucose, Urine, Qualitative, Dipstick (Completed)    Vaginal discharge        Relevant Orders    Candida panel, PCR - Swab, Vagina    Bacterial Vaginosis, LYNETTE - Swab, Cervix    POC Wet Prep (Completed)    POC KOH Prep (Completed)    Nausea and vomiting, unspecified  vomiting type              She has gained 6 lbs this pregnancy. She is able to keep some fluids down but it has become harder and mostly consists of caffeine free pepsi and some cran-grape juice. Will plan IV fluids at the time of her iron infusions. She is taking zofran as needed, she failed B6, unisom, and phenergan. Will add pepcid. Advised small frequent meals. Call if unable to hold fluids down > 12-24 hours. PDC U/S follow up is scheduled for 2 weeks.     Pregnancy at 30w2d  Fetal status reassuring.   Yeast positive of wet prep, treat with terazol 7.  RTC for worsening symptoms  Return for Next scheduled follow up. PDC and F/U scheduled  Cultures sent to confirm yeast    Nena Morrison, APRN  09/19/2023

## 2023-09-21 LAB
A VAGINAE DNA VAG QL NAA+PROBE: NORMAL SCORE
BVAB2 DNA VAG QL NAA+PROBE: NORMAL SCORE
C ALBICANS DNA VAG QL NAA+PROBE: NEGATIVE
C GLABRATA DNA VAG QL NAA+PROBE: NEGATIVE
C KRUSEI DNA VAG QL NAA+PROBE: NEGATIVE
C LUSITANIAE DNA VAG QL NAA+PROBE: NEGATIVE
CANDIDA DNA VAG QL NAA+PROBE: NEGATIVE
MEGA1 DNA VAG QL NAA+PROBE: NORMAL SCORE

## 2023-09-22 NOTE — TELEPHONE ENCOUNTER
Called patient back to let her know they received order and preauthorizing and will call to schedule

## 2023-09-22 NOTE — TELEPHONE ENCOUNTER
PT CALLING TO CHECK THE STATUS OF THE INFUSIONS BEING SET UP SHE HASN'T HEARD FROM ANYONE TO GET THEM SET UP PLEASE CALL PT

## 2023-09-26 ENCOUNTER — TELEPHONE (OUTPATIENT)
Dept: OBSTETRICS AND GYNECOLOGY | Facility: CLINIC | Age: 38
End: 2023-09-26

## 2023-09-26 NOTE — TELEPHONE ENCOUNTER
ARTHUR FROM [facility] CALLED AND SAID THEY RECEIVED AN ORDER FOR IRON INFUSE FOR PATIENT BUT THEY NEED PATIENT DEMOGRAPHICS AND INSURANCE INFORMATION FAXED TO [phone] PLEASE ADD ATTENTION ARTHUR ON TOP.

## 2023-10-02 ENCOUNTER — ROUTINE PRENATAL (OUTPATIENT)
Dept: OBSTETRICS AND GYNECOLOGY | Facility: CLINIC | Age: 38
End: 2023-10-02
Payer: COMMERCIAL

## 2023-10-02 ENCOUNTER — TELEPHONE (OUTPATIENT)
Dept: OBSTETRICS AND GYNECOLOGY | Facility: CLINIC | Age: 38
End: 2023-10-02

## 2023-10-02 VITALS — WEIGHT: 135.4 LBS | DIASTOLIC BLOOD PRESSURE: 65 MMHG | SYSTOLIC BLOOD PRESSURE: 108 MMHG | BODY MASS INDEX: 23.23 KG/M2

## 2023-10-02 DIAGNOSIS — Z3A.32 32 WEEKS GESTATION OF PREGNANCY: ICD-10-CM

## 2023-10-02 DIAGNOSIS — Z34.93 THIRD TRIMESTER PREGNANCY: ICD-10-CM

## 2023-10-02 DIAGNOSIS — Z23 NEED FOR TDAP VACCINATION: ICD-10-CM

## 2023-10-02 DIAGNOSIS — R11.2 NAUSEA AND VOMITING, UNSPECIFIED VOMITING TYPE: ICD-10-CM

## 2023-10-02 DIAGNOSIS — O09.529 ANTEPARTUM MULTIGRAVIDA OF ADVANCED MATERNAL AGE: Primary | ICD-10-CM

## 2023-10-02 LAB
EXPIRATION DATE: 0
GLUCOSE UR STRIP-MCNC: NEGATIVE MG/DL
Lab: 0
PROT UR STRIP-MCNC: ABNORMAL MG/DL

## 2023-10-02 RX ORDER — LANSOPRAZOLE 30 MG/1
30 CAPSULE, DELAYED RELEASE ORAL DAILY
Qty: 30 CAPSULE | Refills: 2 | Status: SHIPPED | OUTPATIENT
Start: 2023-10-02

## 2023-10-02 NOTE — TELEPHONE ENCOUNTER
Spoke with patient. Advised patient that I spoke with Shola with scheduling at [facility]. Per Shola, he spoke with the patients insurance this morning and they stated to him that they were still reviewing and had a close date of Oct 12 to approve her iron infusions. The patient states that she spoke with a nurse  with her insurance and was told everything was approved. Advised patient to call her insurance company back and speak with them in regards to this. Pt CINTHYA.        -Sameera Badillo

## 2023-10-02 NOTE — TELEPHONE ENCOUNTER
Patient here for her appt. She states she still has not heard back from [facility] in regards to scheduling her iron infusion. Patient states she has called them several times and was told they needed pt demographics from our office.       According to patients records we faxed demographic sheet and insurance info to central scheduling at [facility] last week. Per [employer] Sheehan , [employer]  called to follow up last Thurs about this and was told they had what they needed for the patient.     Attempted to call central scheduling at [facility] to see what else they may need from our office in order to get the patient scheduled for her infusion. Message was left for a call back. Advised pt that we would call her back once we hear back from [facility].

## 2023-10-02 NOTE — PROGRESS NOTES
OB FOLLOW UP  CC- Here for care of pregnancy        Maddison Wiley is a 38 y.o.  32w1d patient being seen today for her obstetrical follow up visit. Patient reports visual changes that is not accompanied with a headache.She reports occasional black floaters. She reports she has checked her BP at home when this happens but it was normal. She reports occasional BH contractions. She reports nausea with vomiting multiple times per day. She is attempting to eat small meals frequently. She reports vomiting every time she eats. She has not heard back about scheduling her iron infusion yet. She reports feeling very fatigued.     Her prenatal care is complicated by (and status) :   see below   Patient Active Problem List   Diagnosis    Depression with anxiety    Pregnancy    Antepartum multigravida of advanced maternal age    Genital herpes simplex virus (HSV) infection in mother affecting pregnancy       Flu Status: Declines  TDAP status: given today  Rhogam status: was not indicated  28 week labs: Reviewed and Labs show anemia. She is taking additional iron supplement. Not tolerating due to N/V  Ultrasound Today: Yes for fetal growth   Non Stress Test: No.      ROS -   Patient Reports : Nausea and Vomiting. She reports vomiting every time she eats. She reports BH contractions, she reports black floaters in vision not accompanied by headache, fatigue   Patient Denies: Loss of Fluid, Vaginal Spotting, and Headaches  Fetal Movement : normal  All other systems reviewed and are negative.       The additional following portions of the patient's history were reviewed and updated as appropriate: allergies and current medications.    I have reviewed and agree with the HPI, ROS, and historical information as entered above. Nena Morrison, APRN      /65   Wt 61.4 kg (135 lb 6.4 oz)   LMP 2023   BMI 23.23 kg/m²         EXAM:     Prenatal Vitals  BP: 108/65  Weight: 61.4 kg (135 lb 6.4 oz)   Fetal Heart  Rate: 132               Urine Glucose Read-only: Negative  Urine Protein Read-only: (!) 1+           Assessment and Plan    Problem List Items Addressed This Visit          Gravid and     Pregnancy    Overview     2 prev  term           Antepartum multigravida of advanced maternal age - Primary    Relevant Orders    US Ob Follow Up Transabdominal Approach     Other Visit Diagnoses       Nausea and vomiting, unspecified vomiting type        Relevant Orders    US Ob Follow Up Transabdominal Approach    Third trimester pregnancy        Relevant Orders    POC Protein, Urine, Qualitative, Dipstick (Completed)    POC Glucose, Urine, Qualitative, Dipstick (Completed)    Need for Tdap vaccination        Relevant Orders    Tdap Vaccine Greater Than or Equal To 8yo IM (Completed)          She has had some improvement in N/V since starting pepcid, still has some reflux. We will change to prevacid. Continue small, frequent meals.   We are still trying to get her in for iron infusion at [facility]. If unable to resolve the current issue we will try to send her to [facility]. Plan IV fluids while there for persistent N/V.    Pregnancy at 32w1d  Fetal status reassuring.  28 week labs reviewed.    Activity and Exercise discussed.  Fetal movement/PTL or Labor precautions  Return in about 2 weeks (around 10/16/2023) for KS JATINDER..  U/S reviewed, EFW- 62%, LIOR- 12    Nena Morrison, APRN  10/02/2023

## 2023-10-02 NOTE — TELEPHONE ENCOUNTER
Spoke with Shola at [facility] who schedules the infusions. Per Shola they are waiting for the insurance to review. He spoke with them this morning. They have not requested any additional supporting info at this time from our office. The insurance informed Shola they have a close date of Oct 12 to review. Will call and update pt of this.         -DANIELA Badillo

## 2023-10-02 NOTE — TELEPHONE ENCOUNTER
Caller: Maddison Wiley    Relationship: Self    Best call back number: [phone]    What is the best time to reach you: ANYTIME     Who are you requesting to speak with (clinical staff, provider,  specific staff member): CLINICAL STAFF ( PRAVIN)        What was the call regarding: IRON TRANSFUSION

## 2023-10-18 ENCOUNTER — ROUTINE PRENATAL (OUTPATIENT)
Dept: OBSTETRICS AND GYNECOLOGY | Facility: CLINIC | Age: 38
End: 2023-10-18
Payer: COMMERCIAL

## 2023-10-18 VITALS — DIASTOLIC BLOOD PRESSURE: 76 MMHG | WEIGHT: 136.6 LBS | BODY MASS INDEX: 23.44 KG/M2 | SYSTOLIC BLOOD PRESSURE: 104 MMHG

## 2023-10-18 DIAGNOSIS — A60.09 GENITAL HERPES SIMPLEX VIRUS (HSV) INFECTION IN MOTHER AFFECTING PREGNANCY: ICD-10-CM

## 2023-10-18 DIAGNOSIS — O09.529 ANTEPARTUM MULTIGRAVIDA OF ADVANCED MATERNAL AGE: ICD-10-CM

## 2023-10-18 DIAGNOSIS — Z3A.34 34 WEEKS GESTATION OF PREGNANCY: ICD-10-CM

## 2023-10-18 DIAGNOSIS — F41.8 DEPRESSION WITH ANXIETY: ICD-10-CM

## 2023-10-18 DIAGNOSIS — O98.319 GENITAL HERPES SIMPLEX VIRUS (HSV) INFECTION IN MOTHER AFFECTING PREGNANCY: ICD-10-CM

## 2023-10-18 DIAGNOSIS — Z34.93 THIRD TRIMESTER PREGNANCY: Primary | ICD-10-CM

## 2023-10-18 LAB
EXPIRATION DATE: 0
GLUCOSE UR STRIP-MCNC: NEGATIVE MG/DL
Lab: 0
PROT UR STRIP-MCNC: ABNORMAL MG/DL

## 2023-10-18 PROCEDURE — 0502F SUBSEQUENT PRENATAL CARE: CPT | Performed by: OBSTETRICS & GYNECOLOGY

## 2023-10-18 NOTE — PROGRESS NOTES
OB FOLLOW UP  CC- Here for care of pregnancy        Maddison Wiley is a 38 y.o.  34w3d patient being seen today for her obstetrical follow up visit. Patient reports Telfair Ahumada Contractions,She also has pressure that feels like her  baby is pushing down over the past 1-2 weeks that started as intermittent and she now states it is constant..     Her prenatal care is complicated by (and status) : AMA, Anemia,HSV  Patient Active Problem List   Diagnosis    Depression with anxiety    Pregnancy    Antepartum multigravida of advanced maternal age    Genital herpes simplex virus (HSV) infection in mother affecting pregnancy       Flu Status: Declines  Ultrasound Today: No  Non Stress Test: No.    ROS -   Patient Reports :  Dexter Ahumada Contractions, Pressure, Sternum pain  Patient Denies: Loss of Fluid, Vaginal Spotting, Vision Changes, and Headaches  Fetal Movement : normal  All other systems reviewed and are negative.       The additional following portions of the patient's history were reviewed and updated as appropriate: allergies, current medications, past family history, past medical history, past social history, past surgical history, and problem list.    I have reviewed and agree with the HPI, ROS, and historical information as entered above. Danni Reed MD      /76   Wt 62 kg (136 lb 9.6 oz)   LMP 2023   BMI 23.44 kg/m²       EXAM:     Prenatal Vitals  BP: 104/76  Weight: 62 kg (136 lb 9.6 oz)   Fetal Heart Rate: +               Urine Glucose Read-only: Negative  Urine Protein Read-only: (!) 3+           Assessment and Plan    Problem List Items Addressed This Visit          Gynecologic and Obstetric Problems    Genital herpes simplex virus (HSV) infection in mother affecting pregnancy    Overview     suppression 36 wks            Other    Depression with anxiety    Relevant Medications    sertraline (Zoloft) 100 MG tablet    Pregnancy    Overview     2 prev  term  EFW 60%ile,  AC 70%ile           Antepartum multigravida of advanced maternal age     Other Visit Diagnoses       Third trimester pregnancy    -  Primary    Relevant Orders    POC Protein, Urine, Qualitative, Dipstick (Completed)    POC Glucose, Urine, Qualitative, Dipstick (Completed)            Pregnancy at 34w3d. Getting her iron infusion this week.   Desires to sched 39 wk induction.   Fetal status reassuring.   Activity and Exercise discussed.  Fetal movement/PTL or Labor precautions  GBS next visit  Return in about 2 weeks (around 11/1/2023) for F/U Prenatal.    Danni Reed MD  10/18/2023

## 2023-10-25 ENCOUNTER — TELEPHONE (OUTPATIENT)
Dept: OBSTETRICS AND GYNECOLOGY | Facility: CLINIC | Age: 38
End: 2023-10-25
Payer: COMMERCIAL

## 2023-10-25 NOTE — TELEPHONE ENCOUNTER
[facility] called and states patient's  called to reschedule iron infusion for the third time.  She rescheduled for 10-27-23

## 2023-11-01 ENCOUNTER — ROUTINE PRENATAL (OUTPATIENT)
Dept: OBSTETRICS AND GYNECOLOGY | Facility: CLINIC | Age: 38
End: 2023-11-01
Payer: COMMERCIAL

## 2023-11-01 VITALS — WEIGHT: 135 LBS | SYSTOLIC BLOOD PRESSURE: 100 MMHG | BODY MASS INDEX: 23.16 KG/M2 | DIASTOLIC BLOOD PRESSURE: 76 MMHG

## 2023-11-01 DIAGNOSIS — Z3A.36 36 WEEKS GESTATION OF PREGNANCY: ICD-10-CM

## 2023-11-01 DIAGNOSIS — O09.529 ANTEPARTUM MULTIGRAVIDA OF ADVANCED MATERNAL AGE: ICD-10-CM

## 2023-11-01 DIAGNOSIS — Z36.85 ENCOUNTER FOR ANTENATAL SCREENING FOR STREPTOCOCCUS B: Primary | ICD-10-CM

## 2023-11-01 DIAGNOSIS — A60.09 GENITAL HERPES SIMPLEX VIRUS (HSV) INFECTION IN MOTHER AFFECTING PREGNANCY: ICD-10-CM

## 2023-11-01 DIAGNOSIS — O98.319 GENITAL HERPES SIMPLEX VIRUS (HSV) INFECTION IN MOTHER AFFECTING PREGNANCY: ICD-10-CM

## 2023-11-01 LAB
EXPIRATION DATE: 1
GLUCOSE UR STRIP-MCNC: NEGATIVE MG/DL
Lab: 1
PROT UR STRIP-MCNC: ABNORMAL MG/DL

## 2023-11-01 RX ORDER — VALACYCLOVIR HYDROCHLORIDE 500 MG/1
500 TABLET, FILM COATED ORAL 2 TIMES DAILY
Qty: 60 TABLET | Refills: 1 | Status: SHIPPED | OUTPATIENT
Start: 2023-11-01 | End: 2023-12-01

## 2023-11-01 NOTE — PROGRESS NOTES
OB FOLLOW UP  CC- Here for care of pregnancy        Maddison Wiley is a 38 y.o.  36w3d patient being seen today for her obstetrical follow up visit. Patient reports irregular contractions having a sharp pain in the left lower pelvic that started yesterday with pelvic pressure    Her prenatal care is complicated by (and status) : None  Patient Active Problem List   Diagnosis    Depression with anxiety    Pregnancy    Antepartum multigravida of advanced maternal age    Genital herpes simplex virus (HSV) infection in mother affecting pregnancy       GBS Status: Done Today. She is not allergic to PCN.    No Known Allergies       Flu Status: Declines  Her Delivery Plan is: Desires IOL at 39wks. Scheduled    US today: no  Non Stress Test: No.    ROS -   Patient Reports : Contractions  Patient Denies: Loss of Fluid, Vaginal Spotting, Vision Changes, Headaches, Nausea , and Vomiting   Fetal Movement : normal  All other systems reviewed and are negative.       The additional following portions of the patient's history were reviewed and updated as appropriate: allergies and current medications.    I have reviewed and agree with the HPI, ROS, and historical information as entered above. Danni Reed MD        EXAM:     Prenatal Vitals  BP: 100/76  Weight: 61.2 kg (135 lb)   Fetal Heart Rate: +       Dilation/Effacement/Station  Dilation: 1  Effacement (%): 70      Urine Glucose Read-only: Negative  Urine Protein Read-only: (!) 1+           Assessment and Plan    Problem List Items Addressed This Visit          Gynecologic and Obstetric Problems    Genital herpes simplex virus (HSV) infection in mother affecting pregnancy    Overview     suppression 36 wks         Relevant Medications    valACYclovir (Valtrex) 500 MG tablet       Other    Pregnancy    Overview     2 prev  term  EFW 60%ile, AC 70%ile           Relevant Orders    POC Glucose, Urine, Qualitative, Dipstick (Completed)    POC Protein, Urine,  Qualitative, Dipstick (Completed)    Antepartum multigravida of advanced maternal age     Other Visit Diagnoses       Encounter for  screening for Streptococcus B    -  Primary    Relevant Orders    Group B Streptococcus Culture - Swab, Vaginal/Rectum            Pregnancy at 36w3d  Fetal status reassuring.   Reviewed Pre-eclampsia signs/symptoms  Induction sched.   Rx for prophylactic valtrex sent  Delivery options reviewed with patient  Signs of labor reviewed  Kick counts reviewed  Activity and Exercise discussed.  Return in about 1 week (around 2023) for F/U Prenatal.    Danni Reed MD  2023

## 2023-11-05 LAB — B-HEM STREP SPEC QL CULT: NEGATIVE

## 2023-11-08 ENCOUNTER — ROUTINE PRENATAL (OUTPATIENT)
Dept: OBSTETRICS AND GYNECOLOGY | Facility: CLINIC | Age: 38
End: 2023-11-08
Payer: COMMERCIAL

## 2023-11-08 VITALS — WEIGHT: 135.2 LBS | SYSTOLIC BLOOD PRESSURE: 108 MMHG | BODY MASS INDEX: 23.2 KG/M2 | DIASTOLIC BLOOD PRESSURE: 80 MMHG

## 2023-11-08 DIAGNOSIS — F41.8 DEPRESSION WITH ANXIETY: ICD-10-CM

## 2023-11-08 DIAGNOSIS — A60.09 GENITAL HERPES SIMPLEX VIRUS (HSV) INFECTION IN MOTHER AFFECTING PREGNANCY: ICD-10-CM

## 2023-11-08 DIAGNOSIS — O98.319 GENITAL HERPES SIMPLEX VIRUS (HSV) INFECTION IN MOTHER AFFECTING PREGNANCY: ICD-10-CM

## 2023-11-08 DIAGNOSIS — Z3A.37 37 WEEKS GESTATION OF PREGNANCY: ICD-10-CM

## 2023-11-08 DIAGNOSIS — Z34.83 ENCOUNTER FOR SUPERVISION OF OTHER NORMAL PREGNANCY IN THIRD TRIMESTER: Primary | ICD-10-CM

## 2023-11-08 LAB
GLUCOSE UR STRIP-MCNC: NEGATIVE MG/DL
PROT UR STRIP-MCNC: ABNORMAL MG/DL

## 2023-11-08 NOTE — PROGRESS NOTES
"    OB FOLLOW UP  CC- Here for care of pregnancy        Maddison Wiley is a 38 y.o.  37w3d patient being seen today for her obstetrical follow up visit. Patient reports increased clear vaginal discharge.  She had regular contractions last night that resolved after an hour.   She has had 2-3 episodes of \"seeing floaters\" since her last visit.  She continues to have nausea and vomits every time she eats.     Her prenatal care is complicated by (and status) :   Patient Active Problem List   Diagnosis    Depression with anxiety    Pregnancy    Antepartum multigravida of advanced maternal age    Genital herpes simplex virus (HSV) infection in mother affecting pregnancy       GBS Status:   Strep Gp B Culture   Date Value Ref Range Status   2023 Negative Negative Final     Comment:     Centers for Disease Control and Prevention (CDC) and American Congress  of Obstetricians and Gynecologists (ACOG) guidelines for prevention of   group B streptococcal (GBS) disease specify co-collection of  a vaginal and rectal swab specimen to maximize sensitivity of GBS  detection. Per the CDC and ACOG, swabbing both the lower vagina and  rectum substantially increases the yield of detection compared with  sampling the vagina alone.  Penicillin G, ampicillin, or cefazolin are indicated for intrapartum  prophylaxis of  GBS colonization. Reflex susceptibility  testing should be performed prior to use of clindamycin only on GBS  isolates from penicillin-allergic women who are considered a high risk  for anaphylaxis. Treatment with vancomycin without additional testing  is warranted if resistance to clindamycin is noted.           No Known Allergies       Flu Status: Declines  Her Delivery Plan is: Desires IOL at 39wks. Scheduled    US today: no  Non Stress Test: No.    ROS -   Patient Denies: Vaginal Spotting, Headaches, and Epigastric pain  Fetal Movement : normal  All other systems reviewed and are negative. "       The additional following portions of the patient's history were reviewed and updated as appropriate: allergies, current medications, past family history, past medical history, past social history, past surgical history, and problem list.    I have reviewed and agree with the HPI, ROS, and historical information as entered above. Danni Reed MD        EXAM:     Prenatal Vitals  BP: 108/80  Weight: 61.3 kg (135 lb 3.2 oz)   Fetal Heart Rate: +       Dilation/Effacement/Station  Dilation: 2 (2.5)  Effacement (%): 70      Urine Glucose Read-only: Negative  Urine Protein Read-only: (!) 2+           Assessment and Plan    Problem List Items Addressed This Visit          Gynecologic and Obstetric Problems    Genital herpes simplex virus (HSV) infection in mother affecting pregnancy    Overview     suppression 36 wks         Relevant Medications    valACYclovir (Valtrex) 500 MG tablet       Other    Depression with anxiety    Relevant Medications    sertraline (Zoloft) 100 MG tablet    Pregnancy    Overview     2 prev  term  EFW 60%ile, AC 70%ile            Other Visit Diagnoses       Encounter for supervision of other normal pregnancy in third trimester    -  Primary    Relevant Orders    POC Urinalysis Dipstick (Completed)    Preeclampsia Panel            Pregnancy at 37w3d.   Persistant proteinuria. BP normal. Will check PEP today.   Induction is scheduled.   Fetal status reassuring.   Reviewed Pre-eclampsia signs/symptoms  Delivery options reviewed with patient  Signs of labor reviewed  Kick counts reviewed  Activity and Exercise discussed.  Return in about 1 week (around 11/15/2023) for F/U Prenatal.    Danni Reed MD  2023

## 2023-11-09 ENCOUNTER — TELEPHONE (OUTPATIENT)
Dept: OBSTETRICS AND GYNECOLOGY | Facility: CLINIC | Age: 38
End: 2023-11-09
Payer: COMMERCIAL

## 2023-11-09 LAB
ALP SERPL-CCNC: 230 IU/L (ref 44–121)
ALT SERPL-CCNC: 10 IU/L (ref 0–32)
AST SERPL-CCNC: 21 IU/L (ref 0–40)
BASOPHILS # BLD AUTO: 0 X10E3/UL (ref 0–0.2)
BASOPHILS NFR BLD AUTO: 0 %
BILIRUB SERPL-MCNC: 0.5 MG/DL (ref 0–1.2)
CREAT SERPL-MCNC: 0.68 MG/DL (ref 0.57–1)
EGFRCR SERPLBLD CKD-EPI 2021: 114 ML/MIN/1.73
EOSINOPHIL # BLD AUTO: 0.1 X10E3/UL (ref 0–0.4)
EOSINOPHIL NFR BLD AUTO: 1 %
ERYTHROCYTE [DISTWIDTH] IN BLOOD BY AUTOMATED COUNT: 15.1 % (ref 11.7–15.4)
HCT VFR BLD AUTO: 33.7 % (ref 34–46.6)
HGB BLD-MCNC: 10.9 G/DL (ref 11.1–15.9)
IMM GRANULOCYTES # BLD AUTO: 0.1 X10E3/UL (ref 0–0.1)
IMM GRANULOCYTES NFR BLD AUTO: 2 %
LDH SERPL L TO P-CCNC: 234 IU/L (ref 119–226)
LYMPHOCYTES # BLD AUTO: 2.1 X10E3/UL (ref 0.7–3.1)
LYMPHOCYTES NFR BLD AUTO: 22 %
MCH RBC QN AUTO: 28.3 PG (ref 26.6–33)
MCHC RBC AUTO-ENTMCNC: 32.3 G/DL (ref 31.5–35.7)
MCV RBC AUTO: 88 FL (ref 79–97)
MONOCYTES # BLD AUTO: 0.7 X10E3/UL (ref 0.1–0.9)
MONOCYTES NFR BLD AUTO: 7 %
NEUTROPHILS # BLD AUTO: 6.5 X10E3/UL (ref 1.4–7)
NEUTROPHILS NFR BLD AUTO: 68 %
PLATELET # BLD AUTO: 208 X10E3/UL (ref 150–450)
RBC # BLD AUTO: 3.85 X10E6/UL (ref 3.77–5.28)
URATE SERPL-MCNC: 4.9 MG/DL (ref 2.6–6.2)
WBC # BLD AUTO: 9.4 X10E3/UL (ref 3.4–10.8)

## 2023-11-09 NOTE — TELEPHONE ENCOUNTER
Caller: Maddison Wiley    Relationship: Self    Best call back number: [phone] (home)      What is the best time to reach you: ANYTIME    Who are you requesting to speak with (clinical staff, provider,  specific staff member): CLINICAL    Do you know the name of the person who called: NILA     What was the call regarding: PT UNSURE

## 2023-11-11 ENCOUNTER — HOSPITAL ENCOUNTER (INPATIENT)
Facility: HOSPITAL | Age: 38
LOS: 3 days | Discharge: HOME | End: 2023-11-14
Attending: OBSTETRICS & GYNECOLOGY | Admitting: OBSTETRICS & GYNECOLOGY
Payer: COMMERCIAL

## 2023-11-11 ENCOUNTER — ANESTHESIA (OUTPATIENT)
Dept: LABOR AND DELIVERY | Facility: HOSPITAL | Age: 38
End: 2023-11-11
Payer: COMMERCIAL

## 2023-11-11 ENCOUNTER — ANESTHESIA EVENT (OUTPATIENT)
Dept: LABOR AND DELIVERY | Facility: HOSPITAL | Age: 38
End: 2023-11-11
Payer: COMMERCIAL

## 2023-11-11 PROBLEM — Z34.93 THIRD TRIMESTER PREGNANCY: Status: ACTIVE | Noted: 2023-11-11

## 2023-11-11 PROBLEM — Z3A.37 37 WEEKS GESTATION OF PREGNANCY: Status: ACTIVE | Noted: 2023-11-11

## 2023-11-11 LAB
ABO GROUP BLD: NORMAL
BLD GP AB SCN SERPL QL: NEGATIVE
DEPRECATED RDW RBC AUTO: 50.5 FL (ref 37–54)
ERYTHROCYTE [DISTWIDTH] IN BLOOD BY AUTOMATED COUNT: 16.4 % (ref 12.3–15.4)
HCT VFR BLD AUTO: 32.9 % (ref 34–46.6)
HGB BLD-MCNC: 10.6 G/DL (ref 12–15.9)
MCH RBC QN AUTO: 28.6 PG (ref 26.6–33)
MCHC RBC AUTO-ENTMCNC: 32.2 G/DL (ref 31.5–35.7)
MCV RBC AUTO: 88.9 FL (ref 79–97)
PLATELET # BLD AUTO: 222 10*3/MM3 (ref 140–450)
PMV BLD AUTO: 11.4 FL (ref 6–12)
POC AMNISURE: NEGATIVE
RBC # BLD AUTO: 3.7 10*6/MM3 (ref 3.77–5.28)
RH BLD: POSITIVE
T&S EXPIRATION DATE: NORMAL
WBC NRBC COR # BLD: 9.48 10*3/MM3 (ref 3.4–10.8)

## 2023-11-11 PROCEDURE — 25010000002 ROPIVACAINE PER 1 MG: Performed by: ANESTHESIOLOGY

## 2023-11-11 PROCEDURE — 25010000002 ONDANSETRON PER 1 MG: Performed by: ANESTHESIOLOGY

## 2023-11-11 PROCEDURE — 84112 EVAL AMNIOTIC FLUID PROTEIN: CPT | Performed by: OBSTETRICS & GYNECOLOGY

## 2023-11-11 PROCEDURE — 86850 RBC ANTIBODY SCREEN: CPT | Performed by: OBSTETRICS & GYNECOLOGY

## 2023-11-11 PROCEDURE — 59025 FETAL NON-STRESS TEST: CPT

## 2023-11-11 PROCEDURE — C1755 CATHETER, INTRASPINAL: HCPCS

## 2023-11-11 PROCEDURE — C1755 CATHETER, INTRASPINAL: HCPCS | Performed by: ANESTHESIOLOGY

## 2023-11-11 PROCEDURE — 25810000003 LACTATED RINGERS PER 1000 ML: Performed by: OBSTETRICS & GYNECOLOGY

## 2023-11-11 PROCEDURE — 86900 BLOOD TYPING SEROLOGIC ABO: CPT | Performed by: OBSTETRICS & GYNECOLOGY

## 2023-11-11 PROCEDURE — 25010000002 BUPIVACAINE (PF) 0.25 % SOLUTION: Performed by: ANESTHESIOLOGY

## 2023-11-11 PROCEDURE — 86901 BLOOD TYPING SEROLOGIC RH(D): CPT | Performed by: OBSTETRICS & GYNECOLOGY

## 2023-11-11 PROCEDURE — 51703 INSERT BLADDER CATH COMPLEX: CPT

## 2023-11-11 PROCEDURE — 25010000002 FENTANYL CITRATE (PF) 50 MCG/ML SOLUTION: Performed by: ANESTHESIOLOGY

## 2023-11-11 PROCEDURE — 85027 COMPLETE CBC AUTOMATED: CPT | Performed by: OBSTETRICS & GYNECOLOGY

## 2023-11-11 PROCEDURE — 25810000003 LACTATED RINGERS SOLUTION: Performed by: ANESTHESIOLOGY

## 2023-11-11 RX ORDER — EPHEDRINE SULFATE 5 MG/ML
10 INJECTION INTRAVENOUS
Status: DISCONTINUED | OUTPATIENT
Start: 2023-11-11 | End: 2023-11-12 | Stop reason: HOSPADM

## 2023-11-11 RX ORDER — METOCLOPRAMIDE HYDROCHLORIDE 5 MG/ML
10 INJECTION INTRAMUSCULAR; INTRAVENOUS ONCE AS NEEDED
Status: DISCONTINUED | OUTPATIENT
Start: 2023-11-11 | End: 2023-11-12 | Stop reason: HOSPADM

## 2023-11-11 RX ORDER — MAGNESIUM CARB/ALUMINUM HYDROX 105-160MG
30 TABLET,CHEWABLE ORAL ONCE
Status: DISCONTINUED | OUTPATIENT
Start: 2023-11-11 | End: 2023-11-12 | Stop reason: HOSPADM

## 2023-11-11 RX ORDER — ACETAMINOPHEN 325 MG/1
650 TABLET ORAL EVERY 4 HOURS PRN
Status: DISCONTINUED | OUTPATIENT
Start: 2023-11-11 | End: 2023-11-12 | Stop reason: HOSPADM

## 2023-11-11 RX ORDER — ROPIVACAINE HYDROCHLORIDE 2 MG/ML
15 INJECTION, SOLUTION EPIDURAL; INFILTRATION; PERINEURAL CONTINUOUS
Status: DISCONTINUED | OUTPATIENT
Start: 2023-11-11 | End: 2023-11-12

## 2023-11-11 RX ORDER — FENTANYL CITRATE 50 UG/ML
INJECTION, SOLUTION INTRAMUSCULAR; INTRAVENOUS AS NEEDED
Status: DISCONTINUED | OUTPATIENT
Start: 2023-11-11 | End: 2023-11-12 | Stop reason: SURG

## 2023-11-11 RX ORDER — SODIUM CHLORIDE 0.9 % (FLUSH) 0.9 %
10 SYRINGE (ML) INJECTION EVERY 12 HOURS SCHEDULED
Status: DISCONTINUED | OUTPATIENT
Start: 2023-11-11 | End: 2023-11-12 | Stop reason: HOSPADM

## 2023-11-11 RX ORDER — SODIUM CHLORIDE 0.9 % (FLUSH) 0.9 %
10 SYRINGE (ML) INJECTION AS NEEDED
Status: DISCONTINUED | OUTPATIENT
Start: 2023-11-11 | End: 2023-11-12 | Stop reason: HOSPADM

## 2023-11-11 RX ORDER — BUPIVACAINE HYDROCHLORIDE 2.5 MG/ML
INJECTION, SOLUTION EPIDURAL; INFILTRATION; INTRACAUDAL AS NEEDED
Status: DISCONTINUED | OUTPATIENT
Start: 2023-11-11 | End: 2023-11-12 | Stop reason: SURG

## 2023-11-11 RX ORDER — LIDOCAINE HYDROCHLORIDE AND EPINEPHRINE 15; 5 MG/ML; UG/ML
INJECTION, SOLUTION EPIDURAL AS NEEDED
Status: DISCONTINUED | OUTPATIENT
Start: 2023-11-11 | End: 2023-11-12 | Stop reason: SURG

## 2023-11-11 RX ORDER — ONDANSETRON 2 MG/ML
4 INJECTION INTRAMUSCULAR; INTRAVENOUS ONCE AS NEEDED
Status: COMPLETED | OUTPATIENT
Start: 2023-11-11 | End: 2023-11-11

## 2023-11-11 RX ORDER — SODIUM CHLORIDE 9 MG/ML
40 INJECTION, SOLUTION INTRAVENOUS AS NEEDED
Status: DISCONTINUED | OUTPATIENT
Start: 2023-11-11 | End: 2023-11-12 | Stop reason: HOSPADM

## 2023-11-11 RX ORDER — DIPHENHYDRAMINE HYDROCHLORIDE 50 MG/ML
12.5 INJECTION INTRAMUSCULAR; INTRAVENOUS EVERY 8 HOURS PRN
Status: DISCONTINUED | OUTPATIENT
Start: 2023-11-11 | End: 2023-11-12 | Stop reason: HOSPADM

## 2023-11-11 RX ORDER — FAMOTIDINE 10 MG/ML
20 INJECTION, SOLUTION INTRAVENOUS ONCE AS NEEDED
Status: DISCONTINUED | OUTPATIENT
Start: 2023-11-11 | End: 2023-11-12 | Stop reason: HOSPADM

## 2023-11-11 RX ORDER — CITRIC ACID/SODIUM CITRATE 334-500MG
30 SOLUTION, ORAL ORAL ONCE
Status: DISCONTINUED | OUTPATIENT
Start: 2023-11-11 | End: 2023-11-12 | Stop reason: HOSPADM

## 2023-11-11 RX ORDER — LIDOCAINE HYDROCHLORIDE 10 MG/ML
0.5 INJECTION, SOLUTION EPIDURAL; INFILTRATION; INTRACAUDAL; PERINEURAL ONCE AS NEEDED
Status: DISCONTINUED | OUTPATIENT
Start: 2023-11-11 | End: 2023-11-12 | Stop reason: HOSPADM

## 2023-11-11 RX ORDER — SODIUM CHLORIDE, SODIUM LACTATE, POTASSIUM CHLORIDE, CALCIUM CHLORIDE 600; 310; 30; 20 MG/100ML; MG/100ML; MG/100ML; MG/100ML
125 INJECTION, SOLUTION INTRAVENOUS CONTINUOUS
Status: DISCONTINUED | OUTPATIENT
Start: 2023-11-11 | End: 2023-11-12

## 2023-11-11 RX ADMIN — ROPIVACAINE HYDROCHLORIDE 14 ML/HR: 2 INJECTION, SOLUTION EPIDURAL; INFILTRATION at 21:07

## 2023-11-11 RX ADMIN — ONDANSETRON 4 MG: 2 INJECTION INTRAMUSCULAR; INTRAVENOUS at 22:34

## 2023-11-11 RX ADMIN — LIDOCAINE HYDROCHLORIDE AND EPINEPHRINE 3 ML: 15; 5 INJECTION, SOLUTION EPIDURAL at 20:59

## 2023-11-11 RX ADMIN — LIDOCAINE HYDROCHLORIDE AND EPINEPHRINE 2 ML: 15; 5 INJECTION, SOLUTION EPIDURAL at 21:00

## 2023-11-11 RX ADMIN — SODIUM CHLORIDE, POTASSIUM CHLORIDE, SODIUM LACTATE AND CALCIUM CHLORIDE 1000 ML: 600; 310; 30; 20 INJECTION, SOLUTION INTRAVENOUS at 21:21

## 2023-11-11 RX ADMIN — BUPIVACAINE HYDROCHLORIDE 10 ML: 2.5 INJECTION, SOLUTION EPIDURAL; INFILTRATION; INTRACAUDAL; PERINEURAL at 21:02

## 2023-11-11 RX ADMIN — FENTANYL CITRATE 100 MCG: 50 INJECTION, SOLUTION INTRAMUSCULAR; INTRAVENOUS at 21:01

## 2023-11-11 RX ADMIN — SODIUM CHLORIDE, POTASSIUM CHLORIDE, SODIUM LACTATE AND CALCIUM CHLORIDE 125 ML/HR: 600; 310; 30; 20 INJECTION, SOLUTION INTRAVENOUS at 19:46

## 2023-11-12 LAB
AMPHET+METHAMPHET UR QL: NEGATIVE
AMPHETAMINES UR QL: NEGATIVE
ATMOSPHERIC PRESS: ABNORMAL MM[HG]
ATMOSPHERIC PRESS: ABNORMAL MM[HG]
BARBITURATES UR QL SCN: NEGATIVE
BASE EXCESS BLDCOA CALC-SCNC: -12.3 MMOL/L (ref 0–2)
BASE EXCESS BLDCOV CALC-SCNC: -13.1 MMOL/L (ref 0–2)
BDY SITE: ABNORMAL
BDY SITE: ABNORMAL
BENZODIAZ UR QL SCN: NEGATIVE
BODY TEMPERATURE: 37 C
BODY TEMPERATURE: 37 C
BUPRENORPHINE SERPL-MCNC: NEGATIVE NG/ML
CANNABINOIDS SERPL QL: NEGATIVE
CO2 BLDA-SCNC: 23.3 MMOL/L (ref 22–33)
CO2 BLDA-SCNC: 23.6 MMOL/L (ref 22–33)
COCAINE UR QL: NEGATIVE
EPAP: 0
EPAP: 0
FENTANYL UR-MCNC: NEGATIVE NG/ML
HCO3 BLDCOA-SCNC: 20.7 MMOL/L (ref 16.9–20.5)
HCO3 BLDCOV-SCNC: 20.9 MMOL/L (ref 18.6–21.4)
HGB BLDA-MCNC: 15.6 G/DL (ref 14–18)
HGB BLDA-MCNC: 16.9 G/DL (ref 14–18)
INHALED O2 CONCENTRATION: 21 %
INHALED O2 CONCENTRATION: 21 %
IPAP: 0
IPAP: 0
Lab: ABNORMAL
Lab: ABNORMAL
METHADONE UR QL SCN: NEGATIVE
MODALITY: ABNORMAL
MODALITY: ABNORMAL
NOTIFIED BY: ABNORMAL
NOTIFIED BY: ABNORMAL
NOTIFIED WHO: ABNORMAL
NOTIFIED WHO: ABNORMAL
OPIATES UR QL: NEGATIVE
OXYCODONE UR QL SCN: NEGATIVE
PAW @ PEAK INSP FLOW SETTING VENT: 0 CMH2O
PAW @ PEAK INSP FLOW SETTING VENT: 0 CMH2O
PCO2 BLDCOA: 82.4 MMHG (ref 43.3–54.9)
PCO2 BLDCOV: 88 MM HG (ref 28–40)
PCP UR QL SCN: NEGATIVE
PH BLDCOA: 7.01 PH UNITS (ref 7.22–7.3)
PH BLDCOV: 6.99 PH UNITS (ref 7.31–7.37)
PO2 BLDCOA: 8.8 MMHG (ref 11.5–43.3)
PO2 BLDCOV: 3.1 MM HG (ref 21–31)
SAO2 % BLDCOA: 7.8 %
SAO2 % BLDCOA: ABNORMAL %
SAO2 % BLDCOV: ABNORMAL %
TOTAL RATE: 0 BREATHS/MINUTE
TOTAL RATE: 0 BREATHS/MINUTE
TRICYCLICS UR QL SCN: NEGATIVE

## 2023-11-12 PROCEDURE — 82805 BLOOD GASES W/O2 SATURATION: CPT

## 2023-11-12 PROCEDURE — 59025 FETAL NON-STRESS TEST: CPT

## 2023-11-12 PROCEDURE — 10907ZC DRAINAGE OF AMNIOTIC FLUID, THERAPEUTIC FROM PRODUCTS OF CONCEPTION, VIA NATURAL OR ARTIFICIAL OPENING: ICD-10-PCS | Performed by: OBSTETRICS & GYNECOLOGY

## 2023-11-12 PROCEDURE — 88307 TISSUE EXAM BY PATHOLOGIST: CPT | Performed by: OBSTETRICS & GYNECOLOGY

## 2023-11-12 PROCEDURE — 25010000002 ONDANSETRON PER 1 MG: Performed by: OBSTETRICS & GYNECOLOGY

## 2023-11-12 PROCEDURE — 80307 DRUG TEST PRSMV CHEM ANLYZR: CPT | Performed by: OBSTETRICS & GYNECOLOGY

## 2023-11-12 PROCEDURE — 25810000003 LACTATED RINGERS PER 1000 ML: Performed by: OBSTETRICS & GYNECOLOGY

## 2023-11-12 RX ORDER — OXYTOCIN/0.9 % SODIUM CHLORIDE 30/500 ML
250 PLASTIC BAG, INJECTION (ML) INTRAVENOUS CONTINUOUS
Status: ACTIVE | OUTPATIENT
Start: 2023-11-12 | End: 2023-11-12

## 2023-11-12 RX ORDER — PRENATAL VIT/IRON FUM/FOLIC AC 27MG-0.8MG
1 TABLET ORAL DAILY
Status: DISCONTINUED | OUTPATIENT
Start: 2023-11-12 | End: 2023-11-14 | Stop reason: HOSPADM

## 2023-11-12 RX ORDER — MISOPROSTOL 200 UG/1
800 TABLET ORAL ONCE AS NEEDED
Status: DISCONTINUED | OUTPATIENT
Start: 2023-11-12 | End: 2023-11-12 | Stop reason: HOSPADM

## 2023-11-12 RX ORDER — ONDANSETRON 4 MG/1
4 TABLET, FILM COATED ORAL EVERY 8 HOURS PRN
Status: DISCONTINUED | OUTPATIENT
Start: 2023-11-12 | End: 2023-11-14 | Stop reason: HOSPADM

## 2023-11-12 RX ORDER — CARBOPROST TROMETHAMINE 250 UG/ML
250 INJECTION, SOLUTION INTRAMUSCULAR
Status: DISCONTINUED | OUTPATIENT
Start: 2023-11-12 | End: 2023-11-12 | Stop reason: HOSPADM

## 2023-11-12 RX ORDER — OXYCODONE HYDROCHLORIDE 5 MG/1
5 TABLET ORAL EVERY 6 HOURS PRN
Status: DISCONTINUED | OUTPATIENT
Start: 2023-11-12 | End: 2023-11-14 | Stop reason: HOSPADM

## 2023-11-12 RX ORDER — IBUPROFEN 600 MG/1
600 TABLET ORAL EVERY 6 HOURS PRN
Status: DISCONTINUED | OUTPATIENT
Start: 2023-11-12 | End: 2023-11-14 | Stop reason: HOSPADM

## 2023-11-12 RX ORDER — METHYLERGONOVINE MALEATE 0.2 MG/ML
200 INJECTION INTRAVENOUS ONCE AS NEEDED
Status: DISCONTINUED | OUTPATIENT
Start: 2023-11-12 | End: 2023-11-12 | Stop reason: HOSPADM

## 2023-11-12 RX ORDER — OXYTOCIN/0.9 % SODIUM CHLORIDE 30/500 ML
999 PLASTIC BAG, INJECTION (ML) INTRAVENOUS ONCE
Status: COMPLETED | OUTPATIENT
Start: 2023-11-12 | End: 2023-11-12

## 2023-11-12 RX ORDER — ONDANSETRON 2 MG/ML
4 INJECTION INTRAMUSCULAR; INTRAVENOUS EVERY 6 HOURS PRN
Status: DISCONTINUED | OUTPATIENT
Start: 2023-11-12 | End: 2023-11-12

## 2023-11-12 RX ORDER — PROMETHAZINE HYDROCHLORIDE 12.5 MG/1
12.5 TABLET ORAL EVERY 4 HOURS PRN
Status: DISCONTINUED | OUTPATIENT
Start: 2023-11-12 | End: 2023-11-14 | Stop reason: HOSPADM

## 2023-11-12 RX ORDER — HYDROCORTISONE 25 MG/G
1 CREAM TOPICAL AS NEEDED
Status: DISCONTINUED | OUTPATIENT
Start: 2023-11-12 | End: 2023-11-14 | Stop reason: HOSPADM

## 2023-11-12 RX ORDER — DOCUSATE SODIUM 100 MG/1
100 CAPSULE, LIQUID FILLED ORAL 2 TIMES DAILY
Status: DISCONTINUED | OUTPATIENT
Start: 2023-11-12 | End: 2023-11-14 | Stop reason: HOSPADM

## 2023-11-12 RX ORDER — OXYTOCIN/0.9 % SODIUM CHLORIDE 30/500 ML
125 PLASTIC BAG, INJECTION (ML) INTRAVENOUS CONTINUOUS PRN
Status: DISCONTINUED | OUTPATIENT
Start: 2023-11-12 | End: 2023-11-14 | Stop reason: HOSPADM

## 2023-11-12 RX ORDER — SODIUM CHLORIDE 0.9 % (FLUSH) 0.9 %
1-10 SYRINGE (ML) INJECTION AS NEEDED
Status: DISCONTINUED | OUTPATIENT
Start: 2023-11-12 | End: 2023-11-14 | Stop reason: HOSPADM

## 2023-11-12 RX ORDER — ACETAMINOPHEN 325 MG/1
650 TABLET ORAL EVERY 6 HOURS PRN
Status: DISCONTINUED | OUTPATIENT
Start: 2023-11-12 | End: 2023-11-14 | Stop reason: HOSPADM

## 2023-11-12 RX ORDER — IBUPROFEN 600 MG/1
600 TABLET ORAL ONCE
Status: COMPLETED | OUTPATIENT
Start: 2023-11-12 | End: 2023-11-12

## 2023-11-12 RX ORDER — OXYTOCIN/0.9 % SODIUM CHLORIDE 30/500 ML
PLASTIC BAG, INJECTION (ML) INTRAVENOUS
Status: DISCONTINUED
Start: 2023-11-12 | End: 2023-11-14 | Stop reason: HOSPADM

## 2023-11-12 RX ORDER — BISACODYL 10 MG
10 SUPPOSITORY, RECTAL RECTAL DAILY PRN
Status: DISCONTINUED | OUTPATIENT
Start: 2023-11-13 | End: 2023-11-14 | Stop reason: HOSPADM

## 2023-11-12 RX ADMIN — Medication: at 13:05

## 2023-11-12 RX ADMIN — DOCUSATE SODIUM 100 MG: 100 CAPSULE, LIQUID FILLED ORAL at 20:35

## 2023-11-12 RX ADMIN — EPHEDRINE SULFATE 10 MG: 5 INJECTION INTRAVENOUS at 08:00

## 2023-11-12 RX ADMIN — ONDANSETRON 4 MG: 2 INJECTION INTRAMUSCULAR; INTRAVENOUS at 06:44

## 2023-11-12 RX ADMIN — SERTRALINE 50 MG: 50 TABLET, FILM COATED ORAL at 03:12

## 2023-11-12 RX ADMIN — IBUPROFEN 600 MG: 600 TABLET, FILM COATED ORAL at 11:09

## 2023-11-12 RX ADMIN — WITCH HAZEL 1 PAD: 500 SOLUTION RECTAL; TOPICAL at 13:05

## 2023-11-12 RX ADMIN — IBUPROFEN 600 MG: 600 TABLET, FILM COATED ORAL at 20:35

## 2023-11-12 RX ADMIN — SODIUM CHLORIDE, POTASSIUM CHLORIDE, SODIUM LACTATE AND CALCIUM CHLORIDE 125 ML/HR: 600; 310; 30; 20 INJECTION, SOLUTION INTRAVENOUS at 02:53

## 2023-11-12 RX ADMIN — Medication 999 ML/HR: at 08:50

## 2023-11-12 RX ADMIN — Medication 250 ML/HR: at 09:12

## 2023-11-12 RX ADMIN — ACETAMINOPHEN 650 MG: 325 TABLET ORAL at 13:05

## 2023-11-12 RX ADMIN — SERTRALINE 50 MG: 50 TABLET, FILM COATED ORAL at 20:35

## 2023-11-12 RX ADMIN — OXYCODONE HYDROCHLORIDE 5 MG: 5 TABLET ORAL at 22:36

## 2023-11-12 RX ADMIN — OXYCODONE HYDROCHLORIDE 5 MG: 5 TABLET ORAL at 15:11

## 2023-11-12 NOTE — L&D DELIVERY NOTE
" [facility]   Vaginal Delivery Note    Patient Name: Maddison Wiley  : 1985  MRN: [id number]    Date of Delivery: 2023     Diagnosis     Pre & Post-Delivery:  Intrauterine pregnancy at 38w0d  Labor status:      Postpartum care following vaginal delivery 23 (girl)    Third trimester pregnancy    37 weeks gestation of pregnancy             Problem List    Transfer to Postpartum     Review the Delivery Report for details.     Delivery     Delivery:      YOB: 2024    Time of Birth:  Gestational Age 8:43 AM   38w0d     Anesthesia: Epidural     Delivering clinician:     Forceps?   No   Vacuum? No    Shoulder dystocia present: No        Delivery narrative:  Called to see patient due to complete dilation and fetal tones down. Over three contractions viable female infant delivered OA over intact perineum. Anterior shoulder delivered easily followed by posterior shoulder and remainder of infant body. Infant placed on maternal abdomen, bulb suctioned and dried. Umbilical cord clamped x2 and cut after 60 second delay. Placenta then delivered spontaneously with gentle traction, intact with 3VC. Small superficial first degree laceration present and not repaired due to superficial nature and hemostatic. Fundus then firm. Counts correct. EBL 300ml. Of note, there appeared to be port wine colored fluid and the placenta appeared to have a ~ 20% abruption.         Infant     Findings: female  infant     Infant observations: Weight: 3130 g (6 lb 14.4 oz)   Length: 20  in  Observations/Comments:        Apgars:   @ 1 minute /      @ 5 minutes         Placenta & Cord         Placenta delivered    at        Cord:   present.   Nuchal Cord?  no   Cord blood obtained:     Cord gases obtained:      Cord gas results: Venous:  No results found for: \"PHCVEN\", \"BECVEN\"    Arterial:  No results found for: \"PHCART\", \"BECART\"     Repair     Episiotomy: Not recorded     No    Lacerations: No   Estimated " Blood Loss:   300 ml     Quantitative Blood Loss:          Complications     Appeared to have small placental abruption     Disposition     Mother to Mother Baby/Postpartum  in stable condition currently.  Baby to remains with mom  in stable condition currently.    Randi Almeida MD  11/12/23  09:07 EST

## 2023-11-12 NOTE — ANESTHESIA PREPROCEDURE EVALUATION
Anesthesia Evaluation     Patient summary reviewed and Nursing notes reviewed                Airway   Mallampati: I  TM distance: >3 FB  Neck ROM: full  No difficulty expected  Dental      Pulmonary - negative pulmonary ROS   Cardiovascular - negative cardio ROS        Neuro/Psych  (+) headaches, psychiatric history Anxiety and Depression  GI/Hepatic/Renal/Endo - negative ROS     Musculoskeletal (-) negative ROS    Abdominal    Substance History - negative use     OB/GYN    (+) Pregnant        Other                    Anesthesia Plan    ASA 2     epidural       Anesthetic plan, risks, benefits, and alternatives have been provided, discussed and informed consent has been obtained with: patient.    Use of blood products discussed with patient .      CODE STATUS:    Level Of Support Discussed With: Patient  Code Status (Patient has no pulse and is not breathing): CPR (Attempt to Resuscitate)  Medical Interventions (Patient has pulse or is breathing): Full Support

## 2023-11-12 NOTE — H&P
Mable  Maddison Sims  : 1985  MRN: [id number]  CSN: [id number]    History and Physical  Chief Complaint   Patient presents with    Vaginal Discharge       Subjective   Maddison Sims is a 38 y.o. year old  with an Estimated Date of Delivery: 23 currently at 37w6d presenting with  vaginal discharge and regular contractions .  She also reports no complaints.    Prenatal care has been with  Dr. Reed .  It has been complicated by AMA and genital herpes on HSV ppx .    OB History    Para Term  AB Living   5 2 2 0 2 2   SAB IAB Ectopic Molar Multiple Live Births   2 0 0 0 0 2      # Outcome Date GA Lbr Shilo/2nd Weight Sex Delivery Anes PTL Lv   5 Current            4 SAB 2022 10w0d          3 SAB 22 8w0d          2 Term 17 39w3d 04:03 / 00:26 3255 g (7 lb 2.8 oz) M Vag-Spont EPI N MARY ANN      Name: SALLY SIMS      Apgar1: 8  Apgar5: 9   1 Term 12 40w0d  2863 g (6 lb 5 oz) M Vag-Spont EPI N MARY ANN      Name: sonia     Past Medical History:   Diagnosis Date    Anemia     Anxiety     Depression     Dyspareunia in female     Endometriosis     Herpes genitalis     pt said she was told as a teen that she had a positive result but has never had a known outbreak    Hyperemesis gravidarum     2023    Migraine     Ovarian cyst     hx    Pelvic pain     hx    Pregnancy 2023    Recurrent pregnancy loss, antepartum condition or complication     Stress incontinence, female     Varicella 1985     Past Surgical History:   Procedure Laterality Date    DIAGNOSTIC LAPAROSCOPY EXPLORATORY LAPAROTOMY      for endometriosis    ENDOMETRIAL ABLATION      HERNIA REPAIR      x2    INGUINAL HERNIA REPAIR      TONSILLECTOMY      WISDOM TOOTH EXTRACTION         No Known Allergies  Social History    Tobacco Use      Smoking status: Never      Smokeless tobacco: Never    Review of Systems   Constitutional:  Negative for chills and fever.   HENT:  Negative for  "congestion and sore throat.    Respiratory:  Negative for shortness of breath and wheezing.    Cardiovascular:  Negative for chest pain and palpitations.   Gastrointestinal:  Negative for abdominal pain, nausea and vomiting.   Genitourinary:  Negative for frequency, vaginal bleeding and vaginal pain.   Musculoskeletal:  Negative for back pain and myalgias.   Neurological:  Negative for dizziness, light-headedness and headaches.   Psychiatric/Behavioral:  Negative for confusion. The patient is not nervous/anxious.          Objective   /74   Pulse 72   Temp 97.9 °F (36.6 °C) (Oral)   Resp 17   Ht 162.6 cm (64\")   Wt 61.7 kg (136 lb)   LMP 02/19/2023   BMI 23.34 kg/m²   General: well developed; well nourished  no acute distress   Heart: Not performed.   Lungs: breathing is unlabored   Abdomen: soft, non-tender; no masses  no umbilical or inguinal hernias are present  no hepato-splenomegaly   FHT's: reassuring and category 1   Cervix: 4-5 cm with changed to 6-7/70/-2 by RN   Presentation: cephalic   Contractions: regular every 2-4 minutes     Prenatal Labs  Lab Results   Component Value Date    HGB 10.9 (L) 11/08/2023    RUBELLAABIGG 1.01 04/26/2023    HEPBSAG Negative 04/26/2023    ABSCRN Negative 09/06/2023    ZTM8PTL5 Non Reactive 04/26/2023    HEPCVIRUSABY Non Reactive 04/26/2023     09/06/2023    UAH6BZVJ 108 01/05/2017    STREPGPB Negative 11/01/2023    URINECX Final report 04/26/2023    CHLAMNAA Negative 04/26/2023    NGONORRHON Negative 04/26/2023       Current Labs Reviewed   No data reviewed       Assessment   IUP at 37w6d  Group B strep status: negative  AMA  History of HSV - has been on genital ppx     Plan   Expectant management  OK for epidural    Randi Almeida MD  11/11/2023  19:16 EST     "

## 2023-11-12 NOTE — ANESTHESIA PROCEDURE NOTES
Labor Epidural      Patient reassessed immediately prior to procedure    Patient location during procedure: OB  Performed By  Anesthesiologist: Jose Manuel Lang DO  Preanesthetic Checklist  Completed: patient identified, IV checked, site marked, risks and benefits discussed, surgical consent, monitors and equipment checked, pre-op evaluation and timeout performed  Prep:  Pt Position:sitting  Sterile Tech:gloves, mask, sterile barrier and cap  Prep:chlorhexidine gluconate and isopropyl alcohol  Monitoring:blood pressure monitoring and continuous pulse oximetry  Epidural Block Procedure:  Approach:midline  Guidance:landmark technique and palpation technique  Location:L2-L3  Needle Type:Tuohy  Needle Gauge:17 G  Loss of Resistance Medium: air  Loss of Resistance: 4cm  Cath Depth at skin:10 cm  Paresthesia: none  Aspiration:negative  Test Dose:negative  Number of Attempts: 1  Post Assessment:  Dressing:secured with tape and occlusive dressing applied (Tegaderm Placed)  Pt Tolerance:patient tolerated the procedure well with no apparent complications  Complications:no

## 2023-11-12 NOTE — PAYOR COMM NOTE
"Mckay Simsy GELACIO (38 y.o. Female) Initial notification maternity admission with delivery  [id number]      Date of Birth   1985    Social Security Number       Address   [address] [place] [address]    Home Phone   [phone]    MRN   [id number]       [employer]   [employer]    Marital Status                               Admission Date   23    Admission Type   Elective    Admitting Provider   Randi Almeida MD    Attending Provider   Danni Reed MD    Department, Room/Bed   [facility] MOTHER BABY 4A, N403/1       Discharge Date       Discharge Disposition       Discharge Destination                                 Attending Provider: Danni Reed MD    Allergies: No Known Allergies    Isolation: None   Infection: None   Code Status: CPR    Ht: 162.6 cm (64\")   Wt: 61.7 kg (136 lb)    Admission Cmt: None   Principal Problem: Postpartum care following vaginal delivery 23 (girl) [Z39.2]                   Active Insurance as of 2023       Primary Coverage       Payor Plan Insurance Group Employer/Plan Group    [facility] [id number]       Payor Plan Address Payor Plan Phone Number Payor Plan Fax Number Effective Dates    [address]   2020 - None Entered    [facility] [address]         Subscriber Name Subscriber Birth Date Member ID       BOUBACAR SIMS 1980 [id number]                     Emergency Contacts        (Rel.) Home Phone Work Phone Mobile Phone    Boubacar Sims (Spouse) [phone] -- [phone]    JEMIMA WADE (Father) -- -- [phone]              Insurance Information                  [facility] Phone: --    Subscriber: Boubacar Sims Subscriber#: [id number]    Group#: [id number] Precert#: --             History & Physical        Randi Almeida MD at [facility]           Mable BRIZUELA Saurabh  : 1985  MRN: [id number]  CSN: [id number]    History and " Physical  Chief Complaint   Patient presents with    Vaginal Discharge       Subjective  Maddison GELACIO Saurabh is a 38 y.o. year old  with an Estimated Date of Delivery: 23 currently at 37w6d presenting with  vaginal discharge and regular contractions .  She also reports no complaints.    Prenatal care has been with  Dr. Reed .  It has been complicated by AMA and genital herpes on HSV ppx .    OB History    Para Term  AB Living   5 2 2 0 2 2   SAB IAB Ectopic Molar Multiple Live Births   2 0 0 0 0 2      # Outcome Date GA Lbr Shilo/2nd Weight Sex Delivery Anes PTL Lv   5 Current            4 SAB 2022 10w0d          3 SAB 22 8w0d          2 Term 17 39w3d 04:03 :26 3255 g (7 lb 2.8 oz) M Vag-Spont EPI N MARY ANN      Name: SALLY SIMS      Apgar1: 8  Apgar5: 9   1 Term 12 40w0d  2863 g (6 lb 5 oz) M Vag-Spont EPI N MARY ANN      Name: sonia     Past Medical History:   Diagnosis Date    Anemia     Anxiety     Depression     Dyspareunia in female     Endometriosis     Herpes genitalis     pt said she was told as a teen that she had a positive result but has never had a known outbreak    Hyperemesis gravidarum     2023    Migraine     Ovarian cyst     hx    Pelvic pain     hx    Pregnancy 2023    Recurrent pregnancy loss, antepartum condition or complication     Stress incontinence, female     Varicella 1985     Past Surgical History:   Procedure Laterality Date    DIAGNOSTIC LAPAROSCOPY EXPLORATORY LAPAROTOMY      for endometriosis    ENDOMETRIAL ABLATION      HERNIA REPAIR      x2    INGUINAL HERNIA REPAIR      TONSILLECTOMY      WISDOM TOOTH EXTRACTION         No Known Allergies  Social History    Tobacco Use      Smoking status: Never      Smokeless tobacco: Never    Review of Systems   Constitutional:  Negative for chills and fever.   HENT:  Negative for congestion and sore throat.    Respiratory:  Negative for shortness of breath and wheezing.   "  Cardiovascular:  Negative for chest pain and palpitations.   Gastrointestinal:  Negative for abdominal pain, nausea and vomiting.   Genitourinary:  Negative for frequency, vaginal bleeding and vaginal pain.   Musculoskeletal:  Negative for back pain and myalgias.   Neurological:  Negative for dizziness, light-headedness and headaches.   Psychiatric/Behavioral:  Negative for confusion. The patient is not nervous/anxious.          Objective  /74   Pulse 72   Temp 97.9 °F (36.6 °C) (Oral)   Resp 17   Ht 162.6 cm (64\")   Wt 61.7 kg (136 lb)   LMP 2023   BMI 23.34 kg/m²   General: well developed; well nourished  no acute distress   Heart: Not performed.   Lungs: breathing is unlabored   Abdomen: soft, non-tender; no masses  no umbilical or inguinal hernias are present  no hepato-splenomegaly   FHT's: reassuring and category 1   Cervix: 4-5 cm with changed to 6-7/70/-2 by RN   Presentation: cephalic   Contractions: regular every 2-4 minutes     Prenatal Labs  Lab Results   Component Value Date    HGB 10.9 (L) 2023    RUBELLAABIGG 1.01 2023    HEPBSAG Negative 2023    ABSCRN Negative 2023    JNX9UQG7 Non Reactive 2023    HEPCVIRUSABY Non Reactive 2023     2023    PAD6FVNN 108 2017    STREPGPB Negative 2023    URINECX Final report 2023    CHLAMNAA Negative 2023    NGONORRHON Negative 2023       Current Labs Reviewed   No data reviewed       Assessment  IUP at 37w6d  Group B strep status: negative  AMA  History of HSV - has been on genital ppx     Plan  Expectant management  OK for epidural    Randi Almeida MD  2023  19:16 EST       Electronically signed by Randi Almeida MD at 23 1919          Operative/Procedure Notes (last 48 hours)        Randi Almeida MD at 23 0907           [facility]   Vaginal Delivery Note    Patient Name: Maddison Wiley  : 1985  MRN: [id number]    Date of " "Delivery: 11/12/2023     Diagnosis     Pre & Post-Delivery:  Intrauterine pregnancy at 38w0d  Labor status:      Postpartum care following vaginal delivery 11/12/23 (girl)    Third trimester pregnancy    37 weeks gestation of pregnancy             Problem List    Transfer to Postpartum     Review the Delivery Report for details.     Delivery     Delivery:      YOB: 2024    Time of Birth:  Gestational Age 8:43 AM   38w0d     Anesthesia: Epidural     Delivering clinician:     Forceps?   No   Vacuum? No    Shoulder dystocia present: No        Delivery narrative:  Called to see patient due to complete dilation and fetal tones down. Over three contractions viable female infant delivered OA over intact perineum. Anterior shoulder delivered easily followed by posterior shoulder and remainder of infant body. Infant placed on maternal abdomen, bulb suctioned and dried. Umbilical cord clamped x2 and cut after 60 second delay. Placenta then delivered spontaneously with gentle traction, intact with 3VC. Small superficial first degree laceration present and not repaired due to superficial nature and hemostatic. Fundus then firm. Counts correct. EBL 300ml. Of note, there appeared to be port wine colored fluid and the placenta appeared to have a ~ 20% abruption.         Infant     Findings: female  infant     Infant observations: Weight: 3130 g (6 lb 14.4 oz)   Length: 20  in  Observations/Comments:        Apgars:   @ 1 minute /      @ 5 minutes         Placenta & Cord         Placenta delivered    at        Cord:   present.   Nuchal Cord?  no   Cord blood obtained:     Cord gases obtained:      Cord gas results: Venous:  No results found for: \"PHCVEN\", \"BECVEN\"    Arterial:  No results found for: \"PHCART\", \"BECART\"     Repair     Episiotomy: Not recorded     No    Lacerations: No   Estimated Blood Loss:   300 ml     Quantitative Blood Loss:          Complications     Appeared to have small placental abruption "     Disposition     Mother to Mother Baby/Postpartum  in stable condition currently.  Baby to remains with mom  in stable condition currently.    Randi Almeida MD  23  09:07 EST          Electronically signed by Randi Almeida MD at 23 09          Physician Progress Notes (last 48 hours)        Randi Almeida MD at 23 0820           Mable Wiley  : 1985  MRN: [id number]  CSN: [id number]    Labor progress note    Subjective   She is comfortable with the epidural.  She is nauseated after her redose of epidural.     Objective   Min/max vitals past 24 hours:  Temp  Min: 97.8 °F (36.6 °C)  Max: 98.8 °F (37.1 °C)   BP  Min: 103/59  Max: 159/91   Pulse  Min: 72  Max: 117   Resp  Min: 16  Max: 18        FHT's: Intermittent cat 2 FHT (Vds) with moderate variability and + accels   Cervix: was checked (by me): 7-8 cm / 90 % / 0   Contractions: regular every 2 minutes       Assessment   IUP at 38w0d  Progressing in labor  Fetal status reassuring    Plan   Expectant management  AROM - clear and port wine fluid seen   Recheck cervix in 1-2 hours    Randi Almeida MD  2023  08:21 EST           Electronically signed by Randi Almeida MD at 23

## 2023-11-13 LAB
BASOPHILS # BLD AUTO: 0.04 10*3/MM3 (ref 0–0.2)
BASOPHILS NFR BLD AUTO: 0.3 % (ref 0–1.5)
DEPRECATED RDW RBC AUTO: 55.3 FL (ref 37–54)
EOSINOPHIL # BLD AUTO: 0.06 10*3/MM3 (ref 0–0.4)
EOSINOPHIL NFR BLD AUTO: 0.4 % (ref 0.3–6.2)
ERYTHROCYTE [DISTWIDTH] IN BLOOD BY AUTOMATED COUNT: 17 % (ref 12.3–15.4)
HCT VFR BLD AUTO: 29 % (ref 34–46.6)
HGB BLD-MCNC: 9.2 G/DL (ref 12–15.9)
IMM GRANULOCYTES # BLD AUTO: 0.12 10*3/MM3 (ref 0–0.05)
IMM GRANULOCYTES NFR BLD AUTO: 0.9 % (ref 0–0.5)
LYMPHOCYTES # BLD AUTO: 2.25 10*3/MM3 (ref 0.7–3.1)
LYMPHOCYTES NFR BLD AUTO: 16.4 % (ref 19.6–45.3)
MCH RBC QN AUTO: 29.1 PG (ref 26.6–33)
MCHC RBC AUTO-ENTMCNC: 31.7 G/DL (ref 31.5–35.7)
MCV RBC AUTO: 91.8 FL (ref 79–97)
MONOCYTES # BLD AUTO: 1.06 10*3/MM3 (ref 0.1–0.9)
MONOCYTES NFR BLD AUTO: 7.7 % (ref 5–12)
NEUTROPHILS NFR BLD AUTO: 10.17 10*3/MM3 (ref 1.7–7)
NEUTROPHILS NFR BLD AUTO: 74.3 % (ref 42.7–76)
NRBC BLD AUTO-RTO: 0 /100 WBC (ref 0–0.2)
PLATELET # BLD AUTO: 200 10*3/MM3 (ref 140–450)
PMV BLD AUTO: 11.3 FL (ref 6–12)
RBC # BLD AUTO: 3.16 10*6/MM3 (ref 3.77–5.28)
WBC NRBC COR # BLD: 13.7 10*3/MM3 (ref 3.4–10.8)

## 2023-11-13 PROCEDURE — 0503F POSTPARTUM CARE VISIT: CPT | Performed by: ADVANCED PRACTICE MIDWIFE

## 2023-11-13 PROCEDURE — 63710000001 ONDANSETRON PER 8 MG: Performed by: OBSTETRICS & GYNECOLOGY

## 2023-11-13 PROCEDURE — 85025 COMPLETE CBC W/AUTO DIFF WBC: CPT | Performed by: OBSTETRICS & GYNECOLOGY

## 2023-11-13 RX ADMIN — ONDANSETRON HYDROCHLORIDE 4 MG: 4 TABLET, FILM COATED ORAL at 23:14

## 2023-11-13 RX ADMIN — IBUPROFEN 600 MG: 600 TABLET, FILM COATED ORAL at 05:17

## 2023-11-13 RX ADMIN — OXYCODONE HYDROCHLORIDE 5 MG: 5 TABLET ORAL at 08:46

## 2023-11-13 RX ADMIN — OXYCODONE HYDROCHLORIDE 5 MG: 5 TABLET ORAL at 16:14

## 2023-11-13 RX ADMIN — SERTRALINE 50 MG: 50 TABLET, FILM COATED ORAL at 20:03

## 2023-11-13 RX ADMIN — DOCUSATE SODIUM 100 MG: 100 CAPSULE, LIQUID FILLED ORAL at 20:03

## 2023-11-13 RX ADMIN — OXYCODONE HYDROCHLORIDE 5 MG: 5 TABLET ORAL at 23:14

## 2023-11-13 RX ADMIN — PRENATAL VITAMINS-IRON FUMARATE 27 MG IRON-FOLIC ACID 0.8 MG TABLET 1 TABLET: at 08:46

## 2023-11-13 RX ADMIN — DOCUSATE SODIUM 100 MG: 100 CAPSULE, LIQUID FILLED ORAL at 08:45

## 2023-11-13 RX ADMIN — IBUPROFEN 600 MG: 600 TABLET, FILM COATED ORAL at 16:14

## 2023-11-13 NOTE — PROGRESS NOTES
11/13/2023  PPD #1    Subjective   Maddison feels well.  Patient describes her lochia as less than menses.  Pain is well controlled.  Denies headaches or dizziness.          Objective   Temp: Temp:  [97.8 °F (36.6 °C)-98.7 °F (37.1 °C)] 97.8 °F (36.6 °C) Temp src: Oral   BP: BP: (119-143)/(69-87) 132/87        Pulse: Heart Rate:  [] 84  RR: Resp:  [16-20] 20    General:  No acute distress   Abdomen: Fundus firm and beneath umbilicus   Pelvis: deferred     Lab Results   Component Value Date    WBC 13.70 (H) 11/13/2023    HGB 9.2 (L) 11/13/2023    HCT 29.0 (L) 11/13/2023    MCV 91.8 11/13/2023     11/13/2023    HEPBSAG Negative 04/26/2023    AST 21 11/08/2023    ALT 10 11/08/2023    URICACID 4.9 11/08/2023       Assessment  PPD# 1 after vaginal delivery with partial abruption    Plan  Continue routine postpartum care, anticipate discharge in a.m.  Supportive care, breastfeeding, going well.       This note has been electronically signed.    Mabel Dalton, VAZQUEZ  09:41 EST  November 13, 2023

## 2023-11-14 VITALS
DIASTOLIC BLOOD PRESSURE: 76 MMHG | WEIGHT: 136 LBS | RESPIRATION RATE: 16 BRPM | HEART RATE: 80 BPM | HEIGHT: 64 IN | TEMPERATURE: 97.6 F | SYSTOLIC BLOOD PRESSURE: 118 MMHG | BODY MASS INDEX: 23.22 KG/M2

## 2023-11-14 LAB
CYTO UR: NORMAL
LAB AP CASE REPORT: NORMAL
LAB AP CLINICAL INFORMATION: NORMAL
PATH REPORT.FINAL DX SPEC: NORMAL
PATH REPORT.GROSS SPEC: NORMAL

## 2023-11-14 PROCEDURE — 0503F POSTPARTUM CARE VISIT: CPT

## 2023-11-14 PROCEDURE — 63710000001 ONDANSETRON PER 8 MG: Performed by: OBSTETRICS & GYNECOLOGY

## 2023-11-14 RX ORDER — IBUPROFEN 600 MG/1
600 TABLET ORAL EVERY 6 HOURS PRN
Qty: 30 TABLET | Refills: 0 | Status: SHIPPED | OUTPATIENT
Start: 2023-11-14

## 2023-11-14 RX ADMIN — DOCUSATE SODIUM 100 MG: 100 CAPSULE, LIQUID FILLED ORAL at 07:26

## 2023-11-14 RX ADMIN — PRENATAL VITAMINS-IRON FUMARATE 27 MG IRON-FOLIC ACID 0.8 MG TABLET 1 TABLET: at 07:26

## 2023-11-14 RX ADMIN — OXYCODONE HYDROCHLORIDE 5 MG: 5 TABLET ORAL at 09:41

## 2023-11-14 RX ADMIN — ONDANSETRON HYDROCHLORIDE 4 MG: 4 TABLET, FILM COATED ORAL at 07:33

## 2023-11-14 RX ADMIN — IBUPROFEN 600 MG: 600 TABLET, FILM COATED ORAL at 06:10

## 2023-11-14 NOTE — NURSING NOTE
Patient has mild edema of RLE ankle and foot. Patient denies numbness, tingling, and pain. RN palpated patient's leg anterior and posterior from the knee down and she denied any pain or tenderness. There is no heat or redness observed and pulses are normal and equal in both lower extremities. The LLE has no signs of edema.    Patient states that she often sits with her RLE dependently dangling off the side of the bed while the LLE rests on the bed.

## 2023-11-14 NOTE — PAYOR COMM NOTE
"Maddison Sims (38 y.o. Female)     [facility] Auth#[id number]     Discharged 23 with Baby.    From:Glendy Rojas LPN, Utilization Review  Phone #[phone]  Fax #[phone]        Date of Birth   1985    Social Security Number       Address   [address] [place] [address]    Home Phone   [phone]    MRN   [id number]       Amish   Ep[employer]opalian    Marital Status                               Admission Date   23    Admission Type   Elective    Admitting Provider   Randi Almeida MD    Attending Provider       Department, Room/Bed   [facility] MOTHER BABY 4A, N403/1       Discharge Date   2023    Discharge Disposition   Home or Self Care    Discharge Destination                                 Attending Provider: (none)   Allergies: No Known Allergies    Isolation: None   Infection: None   Code Status: CPR    Ht: 162.6 cm (64\")   Wt: 61.7 kg (136 lb)    Admission Cmt: None   Principal Problem: Postpartum care following vaginal delivery 23 (girl) [Z39.2]                   Active Insurance as of 2023       Primary Coverage       Payor Plan Insurance Group Employer/Plan Group    [facility] [id number]       Payor Plan Address Payor Plan Phone Number Payor Plan Fax Number Effective Dates    [address]   2020 - None Entered    [facility] [address]         Subscriber Name Subscriber Birth Date Member ID       BOUBACAR SIMS 1980 [id number]                     Emergency Contacts        (Rel.) Home Phone Work Phone Mobile Phone    Boubacar Sims (Spouse) [phone] -- [phone]    JEMIMA WADE (Father) -- -- [phone]                 Operative/Procedure Notes (last 7 days)        Randi Almeida MD at 23 0907           [facility]   Vaginal Delivery Note    Patient Name: Maddison Sims  : 1985  MRN: [id number]    Date of Delivery: 2023     Diagnosis     Pre & " "Post-Delivery:  Intrauterine pregnancy at 38w0d  Labor status:      Postpartum care following vaginal delivery 11/12/23 (girl)    Third trimester pregnancy    37 weeks gestation of pregnancy             Problem List    Transfer to Postpartum     Review the Delivery Report for details.     Delivery     Delivery:      YOB: 2024    Time of Birth:  Gestational Age 8:43 AM   38w0d     Anesthesia: Epidural     Delivering clinician:     Forceps?   No   Vacuum? No    Shoulder dystocia present: No        Delivery narrative:  Called to see patient due to complete dilation and fetal tones down. Over three contractions viable female infant delivered OA over intact perineum. Anterior shoulder delivered easily followed by posterior shoulder and remainder of infant body. Infant placed on maternal abdomen, bulb suctioned and dried. Umbilical cord clamped x2 and cut after 60 second delay. Placenta then delivered spontaneously with gentle traction, intact with 3VC. Small superficial first degree laceration present and not repaired due to superficial nature and hemostatic. Fundus then firm. Counts correct. EBL 300ml. Of note, there appeared to be port wine colored fluid and the placenta appeared to have a ~ 20% abruption.         Infant     Findings: female  infant     Infant observations: Weight: 3130 g (6 lb 14.4 oz)   Length: 20  in  Observations/Comments:        Apgars:   @ 1 minute /      @ 5 minutes         Placenta & Cord         Placenta delivered    at        Cord:   present.   Nuchal Cord?  no   Cord blood obtained:     Cord gases obtained:      Cord gas results: Venous:  No results found for: \"PHCVEN\", \"BECVEN\"    Arterial:  No results found for: \"PHCART\", \"BECART\"     Repair     Episiotomy: Not recorded     No    Lacerations: No   Estimated Blood Loss:   300 ml     Quantitative Blood Loss:          Complications     Appeared to have small placental abruption     Disposition     Mother to Mother " Baby/Postpartum  in stable condition currently.  Baby to remains with mom  in stable condition currently.    Randi Almeida MD  23  09:07 EST          Electronically signed by Randi Almeida MD at 23 0909          Discharge Summary        Nedra Pritchett APRN at 23 0849       Attestation signed by Danni Reed MD at 23 1035    I have reviewed this documentation and agree.                  Discharge Summary    Date of Admission: 2023  Date of Discharge:  2023      Patient: Maddison Wiley      MR#:[id number]    Delivery Provider: Randi Almeida     Discharge Surgeon/OB: Brianna    Presenting Problem/History of Present Illness  Third trimester pregnancy [Z34.93]  37 weeks gestation of pregnancy [Z3A.37]       Postpartum care following vaginal delivery 23 (girl)    Third trimester pregnancy    37 weeks gestation of pregnancy         Discharge Diagnosis: Vaginal delivery at 38w0d    Procedures:  Vaginal, Spontaneous     2023    8:43 AM        Discharge Date: 2023;     Hospital Course  Patient is a 38 y.o. female  at 38w0d status post vaginal delivery with small placental abruption. Postpartum the patient did well. Patient reported lochia as mild and pain managed. She remained afebrile, with vital signs stable. She was ready for discharge on postpartum day 2.     Infant:   female  fetus 3130 g (6 lb 14.4 oz)  with Apgar scores of 4 , 7  at five minutes.    Condition on Discharge:  Stable    Vital Signs  Temp:  [97.6 °F (36.4 °C)-98.4 °F (36.9 °C)] 97.6 °F (36.4 °C)  Heart Rate:  [80-83] 80  Resp:  [16-18] 16  BP: (117-133)/(76-80) 118/76    Lab Results   Component Value Date    WBC 13.70 (H) 2023    HGB 9.2 (L) 2023    HCT 29.0 (L) 2023    MCV 91.8 2023     2023       Discharge Disposition  Home or Self Care    Discharge Medications     Discharge Medications        New Medications        Instructions Start Date    ibuprofen 600 MG tablet  Commonly known as: ADVIL,MOTRIN   600 mg, Oral, Every 6 Hours PRN             Changes to Medications        Instructions Start Date   sertraline 50 MG tablet  Commonly known as: ZOLOFT  What changed:   medication strength  how much to take  when to take this   50 mg, Oral, Nightly             Stop These Medications      ferrous sulfate 325 (65 FE) MG tablet     Flintstones Complete 18 MG chewable tablet chewable tablet     lansoprazole 30 MG capsule  Commonly known as: Prevacid     ondansetron ODT 4 MG disintegrating tablet  Commonly known as: ZOFRAN-ODT     valACYclovir 500 MG tablet  Commonly known as: Valtrex              Activity at Discharge:   Activity Instructions       Pelvic Rest      No Sex, Tampons, Swimming, Tub Baths x 6 weeks            Follow-up Appointments  Future Appointments   Date Time Provider Department Center   11/15/2023 10:00 AM Danni Reed MD [facility]     Additional Instructions for the Follow-ups that You Need to Schedule       Call MD With Problems / Concerns   As directed      Instructions: Call for symptoms related to depression, foul smelling discharge, fever >/=100.4F, blood clots bigger than a golf ball, saturating through >/= 1 pad per hour.    Order Comments: Instructions: Call for symptoms related to depression, foul smelling discharge, fever >/=100.4F, blood clots bigger than a golf ball, saturating through >/= 1 pad per hour.         Discharge Follow-up with Specified Provider: Brianna; 6 Weeks   As directed      To: Brianna   Follow Up: 6 Weeks                Nedra Pritchett, VAZQUEZ  11/14/23  08:50 EST  Csd            Electronically signed by Danni Reed MD at 11/14/23 8130

## 2023-11-14 NOTE — DISCHARGE SUMMARY
Discharge Summary    Date of Admission: 2023  Date of Discharge:  2023      Patient: Maddison Wiley      MR#:[id number]    Delivery Provider: Randi Almeida     Discharge Surgeon/OB: Brianna    Presenting Problem/History of Present Illness  Third trimester pregnancy [Z34.93]  37 weeks gestation of pregnancy [Z3A.37]       Postpartum care following vaginal delivery 23 (girl)    Third trimester pregnancy    37 weeks gestation of pregnancy         Discharge Diagnosis: Vaginal delivery at 38w0d    Procedures:  Vaginal, Spontaneous     2023    8:43 AM        Discharge Date: 2023;     Hospital Course  Patient is a 38 y.o. female  at 38w0d status post vaginal delivery with small placental abruption. Postpartum the patient did well. Patient reported lochia as mild and pain managed. She remained afebrile, with vital signs stable. She was ready for discharge on postpartum day 2.     Infant:   female  fetus 3130 g (6 lb 14.4 oz)  with Apgar scores of 4 , 7  at five minutes.    Condition on Discharge:  Stable    Vital Signs  Temp:  [97.6 °F (36.4 °C)-98.4 °F (36.9 °C)] 97.6 °F (36.4 °C)  Heart Rate:  [80-83] 80  Resp:  [16-18] 16  BP: (117-133)/(76-80) 118/76    Lab Results   Component Value Date    WBC 13.70 (H) 2023    HGB 9.2 (L) 2023    HCT 29.0 (L) 2023    MCV 91.8 2023     2023       Discharge Disposition  Home or Self Care    Discharge Medications     Discharge Medications        New Medications        Instructions Start Date   ibuprofen 600 MG tablet  Commonly known as: ADVIL,MOTRIN   600 mg, Oral, Every 6 Hours PRN             Changes to Medications        Instructions Start Date   sertraline 50 MG tablet  Commonly known as: ZOLOFT  What changed:   medication strength  how much to take  when to take this   50 mg, Oral, Nightly             Stop These Medications      ferrous sulfate 325 (65 FE) MG tablet     Flintstones Complete 18 MG chewable  tablet chewable tablet     lansoprazole 30 MG capsule  Commonly known as: Prevacid     ondansetron ODT 4 MG disintegrating tablet  Commonly known as: ZOFRAN-ODT     valACYclovir 500 MG tablet  Commonly known as: Valtrex              Activity at Discharge:   Activity Instructions       Pelvic Rest      No Sex, Tampons, Swimming, Tub Baths x 6 weeks            Follow-up Appointments  Future Appointments   Date Time Provider Department Center   11/15/2023 10:00 AM Danni Reed MD [facility]     Additional Instructions for the Follow-ups that You Need to Schedule       Call MD With Problems / Concerns   As directed      Instructions: Call for symptoms related to depression, foul smelling discharge, fever >/=100.4F, blood clots bigger than a golf ball, saturating through >/= 1 pad per hour.    Order Comments: Instructions: Call for symptoms related to depression, foul smelling discharge, fever >/=100.4F, blood clots bigger than a golf ball, saturating through >/= 1 pad per hour.         Discharge Follow-up with Specified Provider: Brianna; 6 Weeks   As directed      To: Brianna   Follow Up: 6 Weeks                Nedra Pritchett, APRN  11/14/23  08:50 EST  Csd

## 2024-01-03 ENCOUNTER — POSTPARTUM VISIT (OUTPATIENT)
Dept: OBSTETRICS AND GYNECOLOGY | Facility: CLINIC | Age: 39
End: 2024-01-03
Payer: COMMERCIAL

## 2024-01-03 VITALS
SYSTOLIC BLOOD PRESSURE: 114 MMHG | WEIGHT: 132 LBS | BODY MASS INDEX: 22.53 KG/M2 | RESPIRATION RATE: 18 BRPM | DIASTOLIC BLOOD PRESSURE: 76 MMHG | HEIGHT: 64 IN

## 2024-01-03 DIAGNOSIS — F41.8 DEPRESSION WITH ANXIETY: ICD-10-CM

## 2024-01-03 DIAGNOSIS — R10.32 LLQ PAIN: ICD-10-CM

## 2024-01-03 RX ORDER — SERTRALINE HYDROCHLORIDE 100 MG/1
100 TABLET, FILM COATED ORAL DAILY
Qty: 90 TABLET | Refills: 3 | Status: SHIPPED | OUTPATIENT
Start: 2024-01-03

## 2024-01-03 RX ORDER — FERROUS SULFATE 325(65) MG
325 TABLET ORAL
COMMUNITY

## 2024-01-03 RX ORDER — METHYLPREDNISOLONE 4 MG/1
4 TABLET ORAL DAILY
COMMUNITY

## 2024-01-03 NOTE — PROGRESS NOTES
"        Chief Complaint   Patient presents with    Postpartum Care       Postpartum Visit         Maddison Wiley is a 38 y.o.  who presents today for a 6 week(s) postpartum check.     Vaginal, Spontaneous    Information for the patient's :  Thomas Wiley [[id number]]   2023   female   Thomas Wiley   3130 g (6 lb 14.4 oz)   Gestational Age: 38w0d        Baby Discharged: Discharged with Mom  Delivering Physician: Randi Almeida MD    At the time of delivery were you diagnosed with any of the following: Small placental abruption. The patient did not have a laceration or episiotomy  is healing well. Patient describes vaginal bleeding as absent.  Patient is bottle feeding.  Patient denies bowel or bladder issues. She reports she is have pain in her left side that comes and goes. She also started an iron supplement and feels like she is constipated at times.  LMP 2 wks ago  Her  is planning vasectomy.  She declines Rx contraception.    Patient denies postpartum depression. Postpartum Depression Screening Questionnaire: 9.  Hx depression, anxiety worsened postpartum.  She denies SI/HI.  She has been on Zoloft throughout pregnancy.        Last Pap : 2022. Results: negative. HPV: negative.   Last Completed Pap Smear            PAP SMEAR (Every 3 Years) Next due on 2025  LIQUID-BASED PAP SMEAR, [facility] ([facility])    11/10/2020  Pap IG, Rfx HPV ASCU                      The additional following portions of the patient's history were reviewed and updated as appropriate: allergies, current medications, past family history, past medical history, past social history, past surgical history, and problem list.    Review of Systems  All other systems reviewed and are negative.     I have reviewed and agree with the HPI, ROS, and historical information as entered above. Sylwia Gonzalez, APRN      /76   Resp 18   Ht 162.6 cm (64.02\")   Wt 59.9 kg (132 " lb)   LMP 12/22/2023 (Exact Date)   Breastfeeding No   BMI 22.65 kg/m²     Physical Exam  Vitals and nursing note reviewed. Exam conducted with a chaperone present.   Constitutional:       General: She is not in acute distress.     Appearance: Normal appearance. She is well-developed. She is not ill-appearing.   Neck:      Thyroid: No thyroid mass or thyromegaly.   Pulmonary:      Effort: Pulmonary effort is normal. No respiratory distress or retractions.   Chest:      Chest wall: No mass.   Breasts:     Right: Normal. No mass, nipple discharge, skin change or tenderness.      Left: Normal. No mass, nipple discharge, skin change or tenderness.   Abdominal:      General: There is no distension.      Palpations: Abdomen is soft. Abdomen is not rigid. There is no mass.      Tenderness: There is no abdominal tenderness. There is no guarding or rebound.      Hernia: No hernia is present. There is no hernia in the left inguinal area.   Genitourinary:     General: Normal vulva.      Labia:         Right: No rash, tenderness or lesion.         Left: No rash, tenderness or lesion.       Vagina: Normal. No vaginal discharge or lesions.      Cervix: Normal.      Uterus: Normal. Not enlarged, not fixed and not tender.       Adnexa: Right adnexa normal and left adnexa normal.        Right: No mass or tenderness.          Left: No mass or tenderness.        Rectum: No external hemorrhoid.   Musculoskeletal:      Cervical back: No muscular tenderness.   Skin:     General: Skin is warm and dry.   Neurological:      Mental Status: She is alert and oriented to person, place, and time.   Psychiatric:         Mood and Affect: Mood normal.         Behavior: Behavior normal.             Assessment and Plan    Problem List Items Addressed This Visit          Mental Health    Depression with anxiety    Relevant Medications    sertraline (ZOLOFT) 100 MG tablet     Other Visit Diagnoses       Postpartum follow-up    -  Primary    LLQ pain                 S/p Vaginal delivery, 6 week(s) postpartum.  Doing well.    Return to normal physical activity.  No pelvic restrictions.   Baby doing well.  Contraception: contraceptive methods: Vasectomy   Return in about 1 year (around 1/3/2025) for Annual physical.   Increase Zoloft to 100 mg qd.  Call prn no improvement or worsening symptoms    Sylwia Gonzalez, APRN  01/03/2024

## 2025-01-10 ENCOUNTER — TELEPHONE (OUTPATIENT)
Dept: OBSTETRICS AND GYNECOLOGY | Facility: CLINIC | Age: 40
End: 2025-01-10
Payer: COMMERCIAL

## 2025-01-10 NOTE — TELEPHONE ENCOUNTER
Spoke with pt and she states she started having some white thick discharge and itching on 01/08/25 with some light pink spotting but the spotting increased to more of period like bleeding on 01/09/25. She denies any recent IC in 4-5 months and she is not on BC. She states her periods are regular. I spoke with Janice SHUKLA and she recommends pt monitor bleeding and schedule for next week. Pt requested an appt with Sylwia SHUKLA in own. Scheduled. I let the pt know if bleeding soaked through more than 1 pad/tampon an hour she needs to go to ED. She VU

## 2025-01-10 NOTE — TELEPHONE ENCOUNTER
Provider:  VAZQUEZ LANCE    Caller: NUBIA SIMS    Phone Number:[phone]     Reason for Call:PATIENT WAS JUST CONCERNED BECAUSE SHE HAD HER REGULAR PERIOD 12/29/24 FOR 5 DAYS BUT SHE STARTED AGAIN LAST NIGHT WHICH IS VERY UNCOMMON FOR HER//PATIENT ISNT SURE WHAT SHE NEEDS TO DO//PLEASE FOLLOW UP

## 2025-01-15 ENCOUNTER — TELEPHONE (OUTPATIENT)
Dept: OBSTETRICS AND GYNECOLOGY | Facility: CLINIC | Age: 40
End: 2025-01-15

## 2025-01-15 NOTE — TELEPHONE ENCOUNTER
Caller: Maddison Wiley    Relationship:  Self    Best call back number: [phone] (home)     PATIENT CALLED REQUESTING TO CANCEL SAME DAY APPT.    Did the patient call AFTER the start time of their scheduled appointment?  []YES  [x]NO    Was the patient's appointment rescheduled? []YES  [x]NO    Any additional information: PT HAD EMERGENCY COME UP

## 2025-01-29 ENCOUNTER — OFFICE VISIT (OUTPATIENT)
Dept: OBSTETRICS AND GYNECOLOGY | Facility: CLINIC | Age: 40
End: 2025-01-29
Payer: COMMERCIAL

## 2025-01-29 VITALS
SYSTOLIC BLOOD PRESSURE: 110 MMHG | WEIGHT: 138 LBS | HEIGHT: 64 IN | DIASTOLIC BLOOD PRESSURE: 84 MMHG | BODY MASS INDEX: 23.56 KG/M2 | RESPIRATION RATE: 18 BRPM

## 2025-01-29 DIAGNOSIS — N89.8 VAGINAL DISCHARGE: ICD-10-CM

## 2025-01-29 DIAGNOSIS — B37.31 YEAST VAGINITIS: ICD-10-CM

## 2025-01-29 DIAGNOSIS — N94.6 DYSMENORRHEA: ICD-10-CM

## 2025-01-29 DIAGNOSIS — Z12.31 BREAST CANCER SCREENING BY MAMMOGRAM: ICD-10-CM

## 2025-01-29 DIAGNOSIS — Z01.419 ENCOUNTER FOR ANNUAL ROUTINE GYNECOLOGICAL EXAMINATION: Primary | ICD-10-CM

## 2025-01-29 DIAGNOSIS — N92.0 MENORRHAGIA WITH REGULAR CYCLE: ICD-10-CM

## 2025-01-29 DIAGNOSIS — F41.8 DEPRESSION WITH ANXIETY: ICD-10-CM

## 2025-01-29 DIAGNOSIS — Z01.419 PAP TEST, AS PART OF ROUTINE GYNECOLOGICAL EXAMINATION: ICD-10-CM

## 2025-01-29 LAB — WET PREP GENITAL: NORMAL

## 2025-01-29 RX ORDER — NAPROXEN SODIUM 550 MG/1
TABLET ORAL
Qty: 60 TABLET | Refills: 1 | Status: SHIPPED | OUTPATIENT
Start: 2025-01-29

## 2025-01-29 RX ORDER — SERTRALINE HYDROCHLORIDE 100 MG/1
100 TABLET, FILM COATED ORAL DAILY
Qty: 90 TABLET | Refills: 3 | Status: SHIPPED | OUTPATIENT
Start: 2025-01-29

## 2025-01-29 RX ORDER — FLUCONAZOLE 150 MG/1
TABLET ORAL
Qty: 2 TABLET | Refills: 0 | Status: SHIPPED | OUTPATIENT
Start: 2025-01-29

## 2025-01-29 NOTE — PROGRESS NOTES
Gynecologic Annual Exam Note          GYN Annual Exam     Annual Exam        Subjective     HPI  Maddison GELACIO Wiley is a 40 y.o. female, , who presents for annual well woman exam as a established patient. There were no changes to her medical or surgical history since her last visit..  Patient's last menstrual period was 2025 (exact date).  Her periods occur every 28 days, lasting 5  days.  The flow is heavy. Patient changes her tampon/pad every 45 minutes. She reports dysmenorrhea is severe occurring first 1-2 days of flow. Marital Status: . She is sexually active. She has not had new partners.. STD testing recommendations have been explained to the patient and she declines STD testing.    The patient would like to discuss the following complaints today: heavy periods and painful cramping.  She has a history of endometriosis.   She is unsure if she wants to have more children.   She did n't like being on OCPs.      Patient is also having some discharge with slight itching    Additional OB/GYN History   contraceptive methods: None    Desires to: do not start contraception  History of migraines: yes without aura    Last Pap : 2022. Result: negative. HPV: negative.   Last Completed Pap Smear            Ordered - PAP SMEAR (Every 3 Years) Ordered on 2022  LIQUID-BASED PAP SMEAR, [facility] ([facility])    11/10/2020  Pap IG, Rfx HPV ASCU                  History of abnormal Pap smear: no  Family history of uterine, colon, breast, or ovarian cancer: no  Performs monthly Self-Breast Exam: no  Last mammogram: N/A.     Colonoscopy: has never had a colonoscopy or cologuard  Exercises Regularly: no  Feelings of Anxiety or Depression: yes - patient takes zolfot  Tobacco Usage?: No       Current Outpatient Medications:     ferrous sulfate 325 (65 FE) MG tablet, Take 1 tablet by mouth Daily With Breakfast., Disp: , Rfl:     sertraline (ZOLOFT) 100 MG tablet, Take 1 tablet by mouth  "Daily., Disp: 90 tablet, Rfl: 3    fluconazole (Diflucan) 150 MG tablet, 1 po now and repeat in 3 days, Disp: 2 tablet, Rfl: 0     Patient is requesting refills of zoloft.    OB History          5    Para   3    Term   3       0    AB   2    Living   3         SAB   2    IAB   0    Ectopic   0    Molar   0    Multiple   0    Live Births   3                Past Medical History:   Diagnosis Date    Anemia     Anxiety     Depression     Dyspareunia in female     Endometriosis     Herpes genitalis     pt said she was told as a teen that she had a positive result but has never had a known outbreak    Hyperemesis gravidarum     2023    Migraine     Ovarian cyst     hx    Pelvic pain     hx    Pregnancy 2023    Recurrent pregnancy loss, antepartum condition or complication     Stress incontinence, female     Varicella 1985        Past Surgical History:   Procedure Laterality Date    DIAGNOSTIC LAPAROSCOPY EXPLORATORY LAPAROTOMY      for endometriosis    ENDOMETRIAL ABLATION      HERNIA REPAIR      x2    INGUINAL HERNIA REPAIR      TONSILLECTOMY      WISDOM TOOTH EXTRACTION         Health Maintenance   Topic Date Due    ANNUAL PHYSICAL  2021    Annual Gynecologic Pelvic and Breast Exam  12/15/2023    MAMMOGRAM  Never done    INFLUENZA VACCINE  Never done    COVID-19 Vaccine (2024- season) Never done    PAP SMEAR  2025    TDAP/TD VACCINES (2 - Td or Tdap) 10/02/2033    HEPATITIS C SCREENING  Completed    Pneumococcal Vaccine 0-64  Aged Out       The additional following portions of the patient's history were reviewed and updated as appropriate: allergies, current medications, past family history, past medical history, past social history, past surgical history, and problem list.    Review of Systems      I have reviewed and agree with the HPI, ROS, and historical information as entered above. Sylwia Gonzalez, APRN          Objective   /84   Resp 18   Ht 162.6 cm (64.02\") "   Wt 62.6 kg (138 lb)   LMP 01/09/2025 (Exact Date)   BMI 23.68 kg/m²     Physical Exam  Vitals and nursing note reviewed. Exam conducted with a chaperone present.   Constitutional:       General: She is not in acute distress.     Appearance: Normal appearance. She is well-developed and normal weight. She is not ill-appearing.   Neck:      Thyroid: No thyroid mass or thyromegaly.   Pulmonary:      Effort: Pulmonary effort is normal. No respiratory distress or retractions.   Chest:      Chest wall: No mass.   Breasts:     Right: Normal. No mass, nipple discharge, skin change or tenderness.      Left: Normal. No mass, nipple discharge, skin change or tenderness.   Abdominal:      General: There is no distension.      Palpations: Abdomen is soft. Abdomen is not rigid. There is no mass.      Tenderness: There is no abdominal tenderness. There is no guarding or rebound.      Hernia: No hernia is present. There is no hernia in the left inguinal area.   Genitourinary:     General: Normal vulva.      Labia:         Right: No rash, tenderness or lesion.         Left: No rash, tenderness or lesion.       Vagina: Normal. No vaginal discharge or lesions.      Cervix: Normal.      Uterus: Normal. Not enlarged, not fixed and not tender.       Adnexa: Right adnexa normal and left adnexa normal.        Right: No mass or tenderness.          Left: No mass or tenderness.        Rectum: No external hemorrhoid.   Musculoskeletal:      Cervical back: No muscular tenderness.   Skin:     General: Skin is warm and dry.   Neurological:      Mental Status: She is alert and oriented to person, place, and time.   Psychiatric:         Mood and Affect: Mood normal.         Behavior: Behavior normal.            Assessment and Plan    Problem List Items Addressed This Visit          Mental Health    Depression with anxiety    Relevant Medications    sertraline (ZOLOFT) 100 MG tablet     Other Visit Diagnoses       Encounter for annual routine  gynecological examination    -  Primary    Relevant Orders    LIQUID-BASED PAP SMEAR WITH HPV GENOTYPING REGARDLESS OF INTERPRETATION ([facility])    Pap test, as part of routine gynecological examination        Dysmenorrhea        Menorrhagia with regular cycle        Vaginal discharge        Relevant Medications    fluconazole (Diflucan) 150 MG tablet    Other Relevant Orders    POC Wet Prep (Completed)    Yeast vaginitis        Relevant Medications    fluconazole (Diflucan) 150 MG tablet            GYN annual well woman exam.   Pap guidelines reviewed.  Reviewed monthly self breast exams.  Instructed to call with lumps, pain, or breast discharge.    Ordered Mammogram today  Reviewed exercise as a preventative health measures.   Reccommended Flu Vaccine in Fall of each year.  Symptoms of menopausal transition reviewed with patient.   RTC in 1 year or PRN with problems.  Return in about 2 weeks (around 2/12/2025) for  FU.     Sylwia Gonzalez, APRN  01/29/2025

## 2025-01-30 LAB — REF LAB TEST METHOD: NORMAL

## 2025-02-19 ENCOUNTER — OFFICE VISIT (OUTPATIENT)
Dept: OBSTETRICS AND GYNECOLOGY | Facility: CLINIC | Age: 40
End: 2025-02-19
Payer: COMMERCIAL

## 2025-02-19 VITALS
DIASTOLIC BLOOD PRESSURE: 80 MMHG | BODY MASS INDEX: 23.73 KG/M2 | SYSTOLIC BLOOD PRESSURE: 124 MMHG | WEIGHT: 139 LBS | HEIGHT: 64 IN

## 2025-02-19 DIAGNOSIS — N94.6 DYSMENORRHEA: ICD-10-CM

## 2025-02-19 DIAGNOSIS — Z30.011 ENCOUNTER FOR INITIAL PRESCRIPTION OF CONTRACEPTIVE PILLS: ICD-10-CM

## 2025-02-19 DIAGNOSIS — N92.0 MENORRHAGIA WITH REGULAR CYCLE: Primary | ICD-10-CM

## 2025-02-19 NOTE — PROGRESS NOTES
Chief Complaint   Patient presents with    Follow-up       Subjective   HPI  Maddisonmitch Wiley is a 40 y.o. female, . Patient's last menstrual period was 2025 (approximate).. who presents for follow up on menorrhagia.      Patient was last seen for her annual exam 2025.  At that time, she c/o issues with menorrhagia.  Her periods occur every 28 days, lasting 5  days.  The flow is heavy. Patient changes her tampon/pad every 45 minutes. She reports dysmenorrhea is severe occurring first 1-2 days of flow.  Patient was advised to RTO for follow up with ultrasound.     The patient reports additional symptoms as none.        Additional OB/GYN History     Last Pap : 2025  Last Completed Pap Smear            PAP SMEAR (Every 3 Years) Next due on 2025  LIQUID-BASED PAP SMEAR WITH HPV GENOTYPING REGARDLESS OF INTERPRETATION ([facility])    2022  LIQUID-BASED PAP SMEAR, [facility] ([facility])    11/10/2020  Pap IG, Rfx HPV ASCU                    Tobacco Usage?: No   OB History          5    Para   3    Term   3       0    AB   2    Living   3         SAB   2    IAB   0    Ectopic   0    Molar   0    Multiple   0    Live Births   3                  Current Outpatient Medications:     ferrous sulfate 325 (65 FE) MG tablet, Take 1 tablet by mouth Daily With Breakfast., Disp: , Rfl:     fluconazole (Diflucan) 150 MG tablet, 1 po now and repeat in 3 days, Disp: 2 tablet, Rfl: 0    naproxen sodium (Anaprox DS) 550 MG tablet, Every 12 hours as needed, Disp: 60 tablet, Rfl: 1    Norethin-Eth Estrad-Fe Biphas (LO LOESTRIN FE) 1 MG-10 MCG / 10 MCG tablet, Take 1 tablet by mouth Daily., Disp: 84 tablet, Rfl: 3    sertraline (ZOLOFT) 100 MG tablet, Take 1 tablet by mouth Daily., Disp: 90 tablet, Rfl: 3     Past Medical History:   Diagnosis Date    Anemia     Anxiety     Depression     Dyspareunia in female     Endometriosis     Herpes genitalis     pt said she  "was told as a teen that she had a positive result but has never had a known outbreak    Hyperemesis gravidarum     2017, 2023    Migraine     Ovarian cyst     hx    Pelvic pain     hx    Pregnancy 04/26/2023    Recurrent pregnancy loss, antepartum condition or complication     Stress incontinence, female     Varicella 1/19/1985        Past Surgical History:   Procedure Laterality Date    DIAGNOSTIC LAPAROSCOPY EXPLORATORY LAPAROTOMY      for endometriosis    ENDOMETRIAL ABLATION      HERNIA REPAIR      x2    INGUINAL HERNIA REPAIR      TONSILLECTOMY      WISDOM TOOTH EXTRACTION         The additional following portions of the patient's history were reviewed and updated as appropriate: allergies, current medications, past family history, past medical history, past social history, past surgical history, and problem list.    Review of Systems    I have reviewed and agree with the HPI, ROS, and historical information as entered above. Sylwia Gonzalez, APRN      Objective   /80   Ht 162.6 cm (64\")   Wt 63 kg (139 lb)   LMP 02/07/2025 (Approximate)   BMI 23.86 kg/m²     Physical Exam  Vitals and nursing note reviewed.   Constitutional:       General: She is not in acute distress.     Appearance: Normal appearance. She is not ill-appearing.   Pulmonary:      Effort: Pulmonary effort is normal. No respiratory distress.   Skin:     General: Skin is warm and dry.   Neurological:      Mental Status: She is alert and oriented to person, place, and time.   Psychiatric:         Mood and Affect: Mood normal.         Behavior: Behavior normal.         Assessment & Plan     Assessment     Problem List Items Addressed This Visit    None  Visit Diagnoses       Menorrhagia with regular cycle    -  Primary    Relevant Medications    Norethin-Eth Estrad-Fe Biphas (LO LOESTRIN FE) 1 MG-10 MCG / 10 MCG tablet    Other Relevant Orders    US Non-ob Transvaginal    Dysmenorrhea        Relevant Medications    Norethin-Eth Estrad-Fe " Biphas (LO LOESTRIN FE) 1 MG-10 MCG / 10 MCG tablet    Encounter for initial prescription of contraceptive pills        Relevant Medications    Norethin-Eth Estrad-Fe Biphas (LO LOESTRIN FE) 1 MG-10 MCG / 10 MCG tablet            Plan     Rev with pt US wnl today.  She wants to take a low dose pill.  Rev correct use, risks, benefits.  LoLoestrin samples given x 2.    Return if symptoms worsen or fail to improve.        Sylwia Gonzalez, APRN  02/19/2025

## 2025-06-10 DIAGNOSIS — B37.31 VAGINAL YEAST INFECTION: Primary | ICD-10-CM

## 2025-06-10 RX ORDER — FLUCONAZOLE 150 MG/1
TABLET ORAL
Qty: 2 TABLET | Refills: 0 | Status: SHIPPED | OUTPATIENT
Start: 2025-06-10

## 2025-07-15 DIAGNOSIS — N92.0 MENORRHAGIA WITH REGULAR CYCLE: ICD-10-CM

## 2025-07-15 DIAGNOSIS — Z30.011 ENCOUNTER FOR INITIAL PRESCRIPTION OF CONTRACEPTIVE PILLS: ICD-10-CM

## 2025-07-15 DIAGNOSIS — N94.6 DYSMENORRHEA: ICD-10-CM
